# Patient Record
Sex: FEMALE | Race: BLACK OR AFRICAN AMERICAN | HISPANIC OR LATINO | Employment: FULL TIME | URBAN - METROPOLITAN AREA
[De-identification: names, ages, dates, MRNs, and addresses within clinical notes are randomized per-mention and may not be internally consistent; named-entity substitution may affect disease eponyms.]

---

## 2017-07-05 ENCOUNTER — ALLSCRIPTS OFFICE VISIT (OUTPATIENT)
Dept: OTHER | Facility: OTHER | Age: 27
End: 2017-07-05

## 2017-07-05 LAB — HCG, QUALITATIVE (HISTORICAL): NEGATIVE

## 2017-07-06 ENCOUNTER — LAB REQUISITION (OUTPATIENT)
Dept: LAB | Facility: HOSPITAL | Age: 27
End: 2017-07-06
Payer: COMMERCIAL

## 2017-07-06 DIAGNOSIS — Z72.51 HIGH RISK HETEROSEXUAL BEHAVIOR: ICD-10-CM

## 2017-07-06 PROCEDURE — 87591 N.GONORRHOEAE DNA AMP PROB: CPT | Performed by: OBSTETRICS & GYNECOLOGY

## 2017-07-06 PROCEDURE — 87491 CHLMYD TRACH DNA AMP PROBE: CPT | Performed by: OBSTETRICS & GYNECOLOGY

## 2017-07-07 LAB
CHLAMYDIA DNA CVX QL NAA+PROBE: NORMAL
N GONORRHOEA DNA GENITAL QL NAA+PROBE: NORMAL

## 2017-07-21 ENCOUNTER — HOSPITAL ENCOUNTER (EMERGENCY)
Facility: HOSPITAL | Age: 27
Discharge: HOME | End: 2017-07-21
Admitting: EMERGENCY MEDICINE
Payer: COMMERCIAL

## 2017-07-21 VITALS
SYSTOLIC BLOOD PRESSURE: 111 MMHG | WEIGHT: 130 LBS | DIASTOLIC BLOOD PRESSURE: 73 MMHG | RESPIRATION RATE: 16 BRPM | HEART RATE: 106 BPM | TEMPERATURE: 99.1 F | OXYGEN SATURATION: 97 %

## 2017-07-21 DIAGNOSIS — S41.112A LACERATION OF LEFT UPPER ARM, INITIAL ENCOUNTER: Primary | ICD-10-CM

## 2017-07-21 PROCEDURE — 99282 EMERGENCY DEPT VISIT SF MDM: CPT

## 2017-07-21 RX ORDER — LIDOCAINE HYDROCHLORIDE AND EPINEPHRINE 10; 10 MG/ML; UG/ML
10 INJECTION, SOLUTION INFILTRATION; PERINEURAL ONCE
Status: COMPLETED | OUTPATIENT
Start: 2017-07-21 | End: 2017-07-21

## 2017-07-21 RX ADMIN — LIDOCAINE HYDROCHLORIDE,EPINEPHRINE BITARTRATE 10 ML: 10; .01 INJECTION, SOLUTION INFILTRATION; PERINEURAL at 20:09

## 2017-08-02 ENCOUNTER — HOSPITAL ENCOUNTER (EMERGENCY)
Facility: HOSPITAL | Age: 27
Discharge: HOME | End: 2017-08-02
Admitting: EMERGENCY MEDICINE
Payer: COMMERCIAL

## 2017-08-02 VITALS
WEIGHT: 131.6 LBS | TEMPERATURE: 98.1 F | RESPIRATION RATE: 16 BRPM | HEART RATE: 93 BPM | SYSTOLIC BLOOD PRESSURE: 122 MMHG | DIASTOLIC BLOOD PRESSURE: 56 MMHG | OXYGEN SATURATION: 96 %

## 2017-08-02 DIAGNOSIS — Z48.02 ENCOUNTER FOR REMOVAL OF SUTURES: Primary | ICD-10-CM

## 2017-08-02 PROCEDURE — 99281 EMR DPT VST MAYX REQ PHY/QHP: CPT

## 2017-08-29 ENCOUNTER — GENERIC CONVERSION - ENCOUNTER (OUTPATIENT)
Dept: OTHER | Facility: OTHER | Age: 27
End: 2017-08-29

## 2017-08-29 ENCOUNTER — ALLSCRIPTS OFFICE VISIT (OUTPATIENT)
Dept: OTHER | Facility: OTHER | Age: 27
End: 2017-08-29

## 2017-08-29 DIAGNOSIS — Z34.91 ENCOUNTER FOR SUPERVISION OF NORMAL PREGNANCY IN FIRST TRIMESTER: ICD-10-CM

## 2017-09-05 ENCOUNTER — GENERIC CONVERSION - ENCOUNTER (OUTPATIENT)
Dept: OTHER | Facility: OTHER | Age: 27
End: 2017-09-05

## 2017-09-12 ENCOUNTER — ALLSCRIPTS OFFICE VISIT (OUTPATIENT)
Dept: OTHER | Facility: OTHER | Age: 27
End: 2017-09-12

## 2017-09-13 ENCOUNTER — GENERIC CONVERSION - ENCOUNTER (OUTPATIENT)
Dept: OTHER | Facility: OTHER | Age: 27
End: 2017-09-13

## 2017-09-13 ENCOUNTER — ALLSCRIPTS OFFICE VISIT (OUTPATIENT)
Dept: OTHER | Facility: OTHER | Age: 27
End: 2017-09-13

## 2017-09-13 ENCOUNTER — LAB REQUISITION (OUTPATIENT)
Dept: LAB | Facility: HOSPITAL | Age: 27
End: 2017-09-13
Payer: COMMERCIAL

## 2017-09-13 DIAGNOSIS — Z34.91 ENCOUNTER FOR SUPERVISION OF NORMAL PREGNANCY IN FIRST TRIMESTER: ICD-10-CM

## 2017-09-13 PROCEDURE — G0145 SCR C/V CYTO,THINLAYER,RESCR: HCPCS | Performed by: STUDENT IN AN ORGANIZED HEALTH CARE EDUCATION/TRAINING PROGRAM

## 2017-09-23 LAB
LAB AP GYN PRIMARY INTERPRETATION: NORMAL
Lab: NORMAL
PATH INTERP SPEC-IMP: NORMAL

## 2017-10-11 ENCOUNTER — GENERIC CONVERSION - ENCOUNTER (OUTPATIENT)
Dept: OTHER | Facility: OTHER | Age: 27
End: 2017-10-11

## 2017-10-11 ENCOUNTER — ALLSCRIPTS OFFICE VISIT (OUTPATIENT)
Dept: OTHER | Facility: OTHER | Age: 27
End: 2017-10-11

## 2017-10-11 LAB
CLUE CELL (HISTORICAL): NORMAL
GLUCOSE (HISTORICAL): NORMAL
HYPHAL YEAST (HISTORICAL): NORMAL
KETONES UR STRIP-MCNC: NORMAL MG/DL
KOH PREP (HISTORICAL): NORMAL
LEUKOCYTE ESTERASE UR QL STRIP: NORMAL
NITRITE UR QL STRIP: NORMAL
PH UR STRIP.AUTO: 4.5 [PH]
PROT UR STRIP-MCNC: 1 MG/DL
TRICHOMONAS (HISTORICAL): NORMAL
YEAST (HISTORICAL): NORMAL

## 2017-10-31 ENCOUNTER — GENERIC CONVERSION - ENCOUNTER (OUTPATIENT)
Dept: OTHER | Facility: OTHER | Age: 27
End: 2017-10-31

## 2017-10-31 DIAGNOSIS — Z34.92 ENCOUNTER FOR SUPERVISION OF NORMAL PREGNANCY IN SECOND TRIMESTER: ICD-10-CM

## 2017-11-22 ENCOUNTER — ALLSCRIPTS OFFICE VISIT (OUTPATIENT)
Dept: PERINATAL CARE | Facility: CLINIC | Age: 27
End: 2017-11-22
Payer: COMMERCIAL

## 2017-11-22 ENCOUNTER — GENERIC CONVERSION - ENCOUNTER (OUTPATIENT)
Dept: OTHER | Facility: OTHER | Age: 27
End: 2017-11-22

## 2017-11-22 PROCEDURE — 76817 TRANSVAGINAL US OBSTETRIC: CPT | Performed by: OBSTETRICS & GYNECOLOGY

## 2017-11-22 PROCEDURE — 76811 OB US DETAILED SNGL FETUS: CPT | Performed by: OBSTETRICS & GYNECOLOGY

## 2017-11-29 ENCOUNTER — APPOINTMENT (OUTPATIENT)
Dept: LAB | Facility: HOSPITAL | Age: 27
End: 2017-11-29
Payer: COMMERCIAL

## 2017-11-29 DIAGNOSIS — Z34.92 ENCOUNTER FOR SUPERVISION OF NORMAL PREGNANCY IN SECOND TRIMESTER: ICD-10-CM

## 2017-11-29 PROCEDURE — 84702 CHORIONIC GONADOTROPIN TEST: CPT

## 2017-11-29 PROCEDURE — 86336 INHIBIN A: CPT

## 2017-11-29 PROCEDURE — 36415 COLL VENOUS BLD VENIPUNCTURE: CPT

## 2017-11-29 PROCEDURE — 82677 ASSAY OF ESTRIOL: CPT

## 2017-11-29 PROCEDURE — 82105 ALPHA-FETOPROTEIN SERUM: CPT

## 2017-12-04 ENCOUNTER — GENERIC CONVERSION - ENCOUNTER (OUTPATIENT)
Dept: OTHER | Facility: OTHER | Age: 27
End: 2017-12-04

## 2017-12-04 ENCOUNTER — APPOINTMENT (OUTPATIENT)
Dept: PERINATAL CARE | Facility: CLINIC | Age: 27
End: 2017-12-04
Payer: COMMERCIAL

## 2017-12-04 PROCEDURE — 76817 TRANSVAGINAL US OBSTETRIC: CPT | Performed by: OBSTETRICS & GYNECOLOGY

## 2017-12-05 LAB
2ND TRIMESTER 4 SCREEN SERPL-IMP: NORMAL
2ND TRIMESTER 4 SCREEN SERPL-IMP: NORMAL
AFP ADJ MOM SERPL: 2.23
AFP SERPL-MCNC: 91.4 NG/ML
AGE AT DELIVERY: 26.1 YEARS
FET TS 18 RISK FROM MAT AGE: NORMAL
FET TS 21 RISK FROM MAT AGE: 977
GA METHOD: NORMAL
GA: 16.9 WEEKS
HCG ADJ MOM SERPL: 0.73
HCG SERPL-ACNC: NORMAL MIU/ML
IDDM PATIENT QL: NO
INHIBIN A ADJ MOM SERPL: 0.89
INHIBIN A SERPL-MCNC: 170.43 PG/ML
KARYOTYP BLD/T: NORMAL
MULTIPLE PREGNANCY: NO
NEURAL TUBE DEFECT RISK FETUS: 492 %
SERVICE CMNT-IMP: NORMAL
TS 18 RISK FETUS: NORMAL
TS 21 RISK FETUS: NORMAL
U ESTRIOL ADJ MOM SERPL: 1.59
U ESTRIOL SERPL-MCNC: 1.72 NG/ML

## 2017-12-18 ENCOUNTER — GENERIC CONVERSION - ENCOUNTER (OUTPATIENT)
Dept: OTHER | Facility: OTHER | Age: 27
End: 2017-12-18

## 2017-12-18 ENCOUNTER — APPOINTMENT (OUTPATIENT)
Dept: PERINATAL CARE | Facility: CLINIC | Age: 27
End: 2017-12-18
Payer: COMMERCIAL

## 2017-12-18 PROCEDURE — 76817 TRANSVAGINAL US OBSTETRIC: CPT | Performed by: OBSTETRICS & GYNECOLOGY

## 2017-12-18 PROCEDURE — 76815 OB US LIMITED FETUS(S): CPT | Performed by: OBSTETRICS & GYNECOLOGY

## 2018-01-10 ENCOUNTER — GENERIC CONVERSION - ENCOUNTER (OUTPATIENT)
Dept: OTHER | Facility: OTHER | Age: 28
End: 2018-01-10

## 2018-01-10 DIAGNOSIS — Z34.92 ENCOUNTER FOR SUPERVISION OF NORMAL PREGNANCY IN SECOND TRIMESTER: ICD-10-CM

## 2018-01-13 VITALS
SYSTOLIC BLOOD PRESSURE: 103 MMHG | WEIGHT: 126 LBS | DIASTOLIC BLOOD PRESSURE: 70 MMHG | HEART RATE: 81 BPM | BODY MASS INDEX: 20.97 KG/M2

## 2018-01-13 VITALS
DIASTOLIC BLOOD PRESSURE: 72 MMHG | HEIGHT: 65 IN | WEIGHT: 134 LBS | SYSTOLIC BLOOD PRESSURE: 116 MMHG | BODY MASS INDEX: 22.33 KG/M2

## 2018-01-15 ENCOUNTER — OB ABSTRACT (OUTPATIENT)
Dept: OBGYN CLINIC | Facility: CLINIC | Age: 28
End: 2018-01-15

## 2018-01-15 RX ORDER — PRENATAL WITH FERROUS FUM AND FOLIC ACID 3080; 920; 120; 400; 22; 1.84; 3; 20; 10; 1; 12; 200; 27; 25; 2 [IU]/1; [IU]/1; MG/1; [IU]/1; MG/1; MG/1; MG/1; MG/1; MG/1; MG/1; UG/1; MG/1; MG/1; MG/1; MG/1
1 TABLET ORAL DAILY
COMMUNITY
End: 2018-09-11

## 2018-01-15 NOTE — PROGRESS NOTES
Chief Complaint  Patient is here for Depo today  Active Problems    1  Birth control (V25 9) (Z30 9)   2  Depot contraception (V25 49) (Z30 40)   3  Encounter for insertion of mirena IUD (V25 11) (Z30 430)   4  Gonorrhea (098 0) (A54 9)   5  Infection, chlamydia (079 98) (A74 9)   6  Screen for STD (sexually transmitted disease) (V74 5) (Z11 3)    Current Meds   1  Azithromycin 250 MG Oral Tablet; take 1 gram PO; Therapy: 74LFQ8766 to (Last Rx:30Jun2016)  Requested for: 68XNX4630 Ordered    Allergies    1  No Known Drug Allergies    2  No Known Environmental Allergies   3  No Known Food Allergies    Vitals  Signs    Systolic: 127  Diastolic: 72  Weight: 917 lb     Assessment    1  Depot contraception (V25 49) (Z30 40)    Plan  SocHx: Depot contraception    · MedroxyPROGESTERone Acetate 150 MG/ML Intramuscular Suspension  (Depo-Provera); INJECT INTRAMUSCULARLY AS DIRECTED    Signatures   Electronically signed by :  Abelardo Reyes, ; Sep 21 2016  5:20PM EST                       (Co-author)

## 2018-01-16 NOTE — MISCELLANEOUS
Message  Patient is having bad nausea  Stacey Eusebio WALKER ordered zofran for patient ot take  if she is still nauseus she needs to call and schedule an apt  or go to the ED if she is dehyrated      Active Problems    1  History of Depot contraception (V25 49) (Z30 40)   2  Encounter for insertion of mirena IUD (V25 11) (Z30 430)   3  Encounter for pregnancy related examination in first trimester (V22 1) (Z34 91)   4  Encounter for screening for HIV (V73 89) (Z11 4)   5  High-risk sexual behavior (V69 2) (Z72 51)   6  Nausea and vomiting in pregnancy (643 90) (O21 9)   7  Screen for STD (sexually transmitted disease) (V74 5) (Z11 3)    Current Meds   1  Ondansetron 4 MG Oral Tablet Disintegrating (Zofran ODT); 1 po q 6 hr prn N/V;   Therapy: 25CAT6897 to (Evaluate:66Men0860)  Requested for: 33Pgl2746; Last   Rx:88Lhf7055 Ordered   2  PNV Prenatal Plus Multivitamin 27-1 MG Oral Tablet; TAKE 1 TABLET DAILY; Therapy: 36Iqf9862 to (Evaluate:84Hzh0021)  Requested for: 99Tjd8852; Last   Rx:76Qoq9935 Ordered    Allergies    1  No Known Drug Allergies    2  No Known Environmental Allergies   3   No Known Food Allergies    Signatures   Electronically signed by : Chase Mauro RN; Sep  5 2017  2:16PM EST                       (Author)

## 2018-01-16 NOTE — MISCELLANEOUS
Provider Comments  Provider Comments:   PT NO SHOW FOR HER PN APPT  LEFT MESSAGE TO R/S HER APPT        Signatures   Electronically signed by : Idris Szymanski, ; Sep 12 2017  9:24AM EST                       (Author)

## 2018-01-17 NOTE — PROGRESS NOTES
2017         RE: Katharine Lucero                                  To: [address]   MR#: [id number]                                   [address] [place] [address]   : [address]  ENC: [id number]                             [place], [address]   (Exam #: [id number])                           Fax: [phone]      The LMP of this 24year old,  G5, P1-1-2-3 patient was unknown, her   working SHOSHANA is 2018 and the current gestational age is 25 weeks 0   days by [address] [place] [address]  A sonographic examination was performed on 2017 using real time equipment  The ultrasound examination was   performed using abdominal & vaginal techniques  The patient has a BMI of   21 5  Her blood pressure today was 101/68  Earliest US on record: 17  10w0d  SHOSHANA  18       Problem list:   1  History of a prior  delivery with twins at 36 weeks and five   days  Her cervix was short with a jenni of 13 millimeters in that   pregnancy  She reports she did not take the vaginal progesterone that was   ordered in that pregnancy  2   A short cervix of 13 millimeters was noted at 20 weeks in this   pregnancy  3   She has not completed any genetic screening  4   History of GC and chlamydia diagnosed and treated in this pregnancy   and her test of cure was negative        Cardiac motion was observed at 146 bpm       INDICATIONS      fetal anatomical survey      Exam Types      LEVEL II   Transvaginal      RESULTS      Fetus # 1 of 1   Transverse presentation   Fetal growth appeared normal   Placenta Location = Posterior   No placenta previa   Placenta Grade = I      MEASUREMENTS (* Included In Average GA)      AC              15 1 cm        20 weeks 0 days* (51%)   BPD              4 2 cm        18 weeks 6 days* (23%)   HC              16 7 cm        19 weeks 2 days* (27%)   Femur            3 2 cm        20 weeks 1 day * (44%)      Nuchal Fold      3 5 mm      Humerus          3 1 cm        20 weeks 3 days  (61%)      Cerebellum       2 2 cm        21 weeks 1 day   Biorbit          3 1 cm        20 weeks 0 days   CisternaMagna    3 9 mm      HC/AC           1 11   FL/AC           0 21   FL/BPD          0 76   EFW (Ac/Fl/Hc)   327 grams - 0 lbs 12 oz      THE AVERAGE GESTATIONAL AGE is 19 weeks 4 days +/- 10 days  AMNIOTIC FLUID         Largest Vertical Pocket = 3 3 cm   Amniotic Fluid: Normal      CERVICAL EVALUATION      SUPINE      Cervical Length: 2 30 cm           Funnel Length: 0 75 cm         Funnel Width: 1 26 cm               Funnelin 59 %      POST TRANS FUNDAL PRESSURE      Cervical Length: 1 76 cm           Funnel Length: 0 83 cm         Funnel Width: 1 35 cm               Funnelin 05 %      OTHER TEST RESULTS           Funneling?: Yes      ANATOMY      Head                                    Normal   Face/Neck                               Normal   Th  Cav  Normal   Heart                                   Normal   Abd  Cav  Normal   Stomach                                 Normal   Right Kidney                            Normal   Left Kidney                             Normal   Bladder                                 Normal   Abd  Wall                               Normal   Spine                                   See Details   Extrems                                 Normal   Genitalia                               Normal   Placenta                                Normal   Umbl   Cord                              Normal   Uterus                                  Normal   PCI                                     Normal      ANATOMY DETAILS      Visualized Appearing Sonographically Normal:   HEAD: (Calvarium, BPD Level, Cavum, Lateral Ventricles, Choroid Plexus,   Cerebellum, Cisterna Magna);    FACE/NECK: (Neck, Nuchal Fold, Profile, Orbits, Nose/Lips, Palate, Face);    TH  CAV  : (Lungs, Diaphragm); HEART: (Four Chamber View, Proximal Left Outflow, Proximal Right Outflow,   3VV, 3 Vessel Trachea, Short Axis of Greater Vessels, Ductal Arch, Aortic   Arch, Interventricular Septum, Interatrial Septum, IVC, SVC, Cardiac Axis,   Cardiac Position);    ABD  CAV : (Liver, Gall Bladder);    STOMACH, RIGHT   KIDNEY, LEFT KIDNEY, BLADDER, ABD  WALL, EXTREMS: (Lt Humerus, Rt Humerus,   Lt Forearm, Rt Forearm, Lt Hand, Rt Hand, Lt Femur, Rt Femur, Lt Low Leg,   Rt Low Leg, Lt Foot, Rt Foot);    GENITALIA, PLACENTA, UMBL  CORD, UTERUS,   PCI      Suboptimally Visualized:   SPINE: (Cervical Spine, Thoracic Spine, Lumbar Spine, Sacrum)      ANATOMY COMMENTS      Her survey of the fetal anatomy is not complete  No fetal structural abnormality or ultrasound marker for aneuploidy is   identified on the Level II ultrasound study today  The missed or limited   views above are secondary to her skin thickness, fetal position, late   gestational age  Fetal growth and amniotic fluid volume appear normal     Active movement of the fetal body & extremities was seen  There is no   suspicion of a subchorionic bleed  The placental cord insertion was   normal       ADNEXA      The left ovary appeared normal and measured 2 8 x 3 0 x 2 6 cm with a   volume of 11 4 cc  The right ovary appeared normal and measured 2 8 x 3 0   x 1 5 cm with a volume of 6 6 cc  IMPRESSION      Vila IUP   19 weeks and 4 days by this ultrasound  (SHOSHANA=2018)   20 weeks and 0 days by 1st Tri Sono  (SHOSHANA=2018)   Transverse presentation   Fetal growth appeared normal   Regular fetal heart rate of 146 bpm   Posterior placenta   No placenta previa      Jodi Areas      Dear Dr Linda Fernandez,      Thank you for requesting a  consultation on your patient Ms Rinaldi for the following indications: hx of PTD with twins at 44w9d and   fetal anatomy      The patient was informed of the findings and counseled about the   limitations of the exam in detecting all forms of fetal congenital   abnormalities  Her cervix is short today and is similar to her prior   pregnancy with twins that ultimately delivered at 36 weeks and five days  She denies any vaginal bleeding or uterine cramping/contractions  She does   feel fetal movement  Follow up recommended:   1  Recommend a follow-up ultrasound at  32 weeks for growth due to her   history of a short cervix   2  Reviewed the signs and symptoms of  labor and when to call her   OB provider  3  Prescribed vaginal progesterone 200 milligrams daily to be taken q h s    until 36 weeks and six days  She is aware she has an increased risk for    delivery based on her cervix that is measuring less than 20   millimeters  4  Recommend a follow-up transvaginal scan in one week to see if there is   any further progression  The majority of time (greater then 50%) was spent on counseling and   coordination of care of this patient and /or family member  Approximate   face to face time was 15 minutes  GUY Aburto M D     Maternal-Fetal Medicine   Electronically signed 17 17:26

## 2018-01-22 VITALS
DIASTOLIC BLOOD PRESSURE: 73 MMHG | HEIGHT: 65 IN | SYSTOLIC BLOOD PRESSURE: 110 MMHG | WEIGHT: 126.5 LBS | BODY MASS INDEX: 21.08 KG/M2

## 2018-01-22 VITALS
HEIGHT: 65 IN | BODY MASS INDEX: 21.09 KG/M2 | DIASTOLIC BLOOD PRESSURE: 66 MMHG | WEIGHT: 126.6 LBS | HEART RATE: 77 BPM | SYSTOLIC BLOOD PRESSURE: 99 MMHG

## 2018-01-22 VITALS
WEIGHT: 129 LBS | HEIGHT: 65 IN | DIASTOLIC BLOOD PRESSURE: 68 MMHG | BODY MASS INDEX: 21.49 KG/M2 | SYSTOLIC BLOOD PRESSURE: 101 MMHG

## 2018-01-22 VITALS — SYSTOLIC BLOOD PRESSURE: 99 MMHG | WEIGHT: 129 LBS | BODY MASS INDEX: 21.47 KG/M2 | DIASTOLIC BLOOD PRESSURE: 67 MMHG

## 2018-01-23 NOTE — PROGRESS NOTES
DEC 18 2017         RE: Katharine Lucero                                  To: [address]   MR#: [id number]                                   [address] [place] [address]   : [address]  ENC: [id number]                             hospitals, [address] [place] [address]   (Exam #: [id number])                           Fax: [phone]      The LMP of this 24year old,  G5, P1-1-2-3 patient was unknown, her   working SHOSHANA is 2018 and the current gestational age is 20 weeks 5   days by [address] [place] [address]  A sonographic examination was performed on DEC   18 2017 using real time equipment  The ultrasound examination was   performed using abdominal & vaginal techniques  The patient has a BMI of   22 8  Her blood pressure today was 123/75  Earliest US on record: 17  10w0d  SHOSHANA  18       Problem list:   1  History of a prior  delivery with twins at 36 weeks and five   days  Her cervix was short with a jenni of 13 millimeters in that   pregnancy  She reports she did not take the vaginal progesterone that was   ordered in that pregnancy  2   A short cervix of 17 millimeters was noted at 20 weeks in this   pregnancy  3   She has not completed any genetic screening  4   History of GC and chlamydia diagnosed and treated in this pregnancy   and her test of cure was negative  Cardiac motion was observed at 149 bpm       INDICATIONS      cervical shortening      Exam Types      Transvaginal      RESULTS      Fetus # 1 of 1   Breech presentation   Placenta Location = Posterior   No placenta previa   Placenta Grade = I      AMNIOTIC FLUID         Largest Vertical Pocket = 4 7 cm   Amniotic Fluid: Normal      CERVICAL EVALUATION      The cervix appeared normal (Ultrasound Examination)        SUPINE      Cervical Length: 3 00 cm      OTHER TEST RESULTS           Funneling?: No             Dynamic Changes?: No Resp  To TFP?: No      IMPRESSION      Vila IUP   23 weeks and 5 days by 1st Tri Sono  (SHOSHANA=2018)   Breech presentation   Regular fetal heart rate of 149 bpm   Posterior placenta   No placenta previa      GENERAL COMMENT      Nicanor Child presents today for an ultrasound to evaluate her cervix  At her   level 2 ultrasound, she was found to have a short cervix however upon   follow-up it was normal   She was initially placed on vaginal progesterone   however the patient admits that she has not picked up this medication  I   encouraged her to do so despite her cervix being reassuring today  We   discussed  labor precautions and I encouraged her to return at her   previously scheduled 32 week growth ultrasound  Please note, in addition to the time spent discussing the results of the   ultrasound, I spent approximately 10 minutes of face-to-face time with the   patient, greater than 50% of which was spent in counseling and the   coordination of care for this patient  GUY Dunne , R LITA Vick     Electronically signed 17 13:29

## 2018-01-23 NOTE — PROGRESS NOTES
DEC 4 2017         RE: July Reyes                                  To: [address]   MR#: [id number]                                   [address] [place] [address]   : [facility]  ENC: [id number]:WALTER Rasheed, [address] [facility]    (Exam #: [id number])                           Fax: [phone]      The LMP of this 24year old,  G5, P1-1-2-3 patient was unknown, her   working SHOSHANA is 2018 and the current gestational age is 22 weeks 5   days by [address] [place] [address]  A sonographic examination was performed on DEC   4 2017 using real time equipment  The ultrasound examination was performed   using abdominal & vaginal techniques  The patient has a BMI of 22 5  Her   blood pressure today was 114/73  Earliest US on record: 17  10w0d  SHOSHANA  18       Problem list:   1  History of a prior  delivery with twins at 36 weeks and five   days  Her cervix was short with a jenni of 13 millimeters in that   pregnancy  She reports she did not take the vaginal progesterone that was   ordered in that pregnancy  2   A short cervix of 17 millimeters was noted at 20 weeks in this   pregnancy  3   She has not completed any genetic screening  4   History of GC and chlamydia diagnosed and treated in this pregnancy   and her test of cure was negative  She did not start her vaginal progesterone  Cardiac motion was observed at 158 bpm       INDICATIONS      cervical shortening   Evaluate missed anatomy      Exam Types      Transvaginal   Level I      RESULTS      Fetus # 1 of 1   Vertex presentation   Placenta Location = Posterior   No placenta previa      AMNIOTIC FLUID         Largest Vertical Pocket = 2 7 cm   Amniotic Fluid: Normal      CERVICAL EVALUATION      The cervix appeared normal (Ultrasound Examination)        SUPINE      Cervical Length: 3 00 cm      OTHER TEST RESULTS Funneling?: No             Dynamic Changes?: No        Resp  To TFP?: No      ANATOMY COMMENTS      Fetal anatomy has been previously documented; no anomalies were   identified  The prior US was limited in the area of the spine which is now    normal  This completes the fetal anatomy  The cervix appears normal today  IMPRESSION      Vila IUP   21 weeks and 5 days by 1st Tri Sono  (SHOSHANA=2018)   Vertex presentation   Regular fetal heart rate of 158 bpm   Posterior placenta   No placenta previa      GENERAL COMMENT      Recommend one last TVS in 2 weeks due to her hx of a short cervix found at   20 weeks and her hx of having GC and chlamydia diagnosed and treated in   this pregnancy  Both of these issues put her at increased risk for a    delivery  GUY Donahue M D     Maternal-Fetal Medicine   Electronically signed 17 12:35

## 2018-01-24 VITALS
HEIGHT: 65 IN | SYSTOLIC BLOOD PRESSURE: 114 MMHG | DIASTOLIC BLOOD PRESSURE: 73 MMHG | BODY MASS INDEX: 22.49 KG/M2 | WEIGHT: 135 LBS

## 2018-01-24 VITALS
WEIGHT: 137 LBS | HEIGHT: 65 IN | BODY MASS INDEX: 22.82 KG/M2 | SYSTOLIC BLOOD PRESSURE: 123 MMHG | DIASTOLIC BLOOD PRESSURE: 75 MMHG

## 2018-01-24 VITALS
HEART RATE: 96 BPM | WEIGHT: 137 LBS | DIASTOLIC BLOOD PRESSURE: 66 MMHG | BODY MASS INDEX: 22.8 KG/M2 | SYSTOLIC BLOOD PRESSURE: 100 MMHG

## 2018-01-31 ENCOUNTER — ROUTINE PRENATAL (OUTPATIENT)
Dept: OBGYN CLINIC | Facility: CLINIC | Age: 28
End: 2018-01-31
Payer: COMMERCIAL

## 2018-01-31 VITALS
WEIGHT: 143 LBS | BODY MASS INDEX: 23.82 KG/M2 | HEART RATE: 102 BPM | HEIGHT: 65 IN | DIASTOLIC BLOOD PRESSURE: 61 MMHG | SYSTOLIC BLOOD PRESSURE: 98 MMHG

## 2018-01-31 DIAGNOSIS — Z34.93 PRENATAL CARE IN THIRD TRIMESTER: Primary | ICD-10-CM

## 2018-01-31 DIAGNOSIS — Z87.51 HISTORY OF PRETERM DELIVERY: ICD-10-CM

## 2018-01-31 DIAGNOSIS — O26.879 SHORT CERVIX AFFECTING PREGNANCY: ICD-10-CM

## 2018-01-31 DIAGNOSIS — Z3A.30 30 WEEKS GESTATION OF PREGNANCY: ICD-10-CM

## 2018-01-31 DIAGNOSIS — B37.3 VAGINAL CANDIDIASIS: ICD-10-CM

## 2018-01-31 LAB
SL AMB  POCT GLUCOSE, UA: NORMAL
SL AMB POCT ALBUMIN: NORMAL
SL AMB POCT WET MOUNT: ABNORMAL

## 2018-01-31 PROCEDURE — 99213 OFFICE O/P EST LOW 20 MIN: CPT | Performed by: OBSTETRICS & GYNECOLOGY

## 2018-01-31 PROCEDURE — 87210 SMEAR WET MOUNT SALINE/INK: CPT | Performed by: OBSTETRICS & GYNECOLOGY

## 2018-01-31 PROCEDURE — 81002 URINALYSIS NONAUTO W/O SCOPE: CPT | Performed by: OBSTETRICS & GYNECOLOGY

## 2018-01-31 RX ORDER — CLOTRIMAZOLE AND BETAMETHASONE DIPROPIONATE 10; .64 MG/G; MG/G
CREAM TOPICAL 2 TIMES DAILY
Qty: 30 G | Refills: 0 | Status: SHIPPED | OUTPATIENT
Start: 2018-01-31 | End: 2018-03-17 | Stop reason: HOSPADM

## 2018-01-31 NOTE — PROGRESS NOTES
Assessment  24 y o  [id number] at 30w0d presenting for routine prenatal visit  Vaginal candidiasis noted with significant external symptoms  Plan  Diagnoses and all orders for this visit:    Prenatal care in third trimester  -     POCT urine dip  -     Prenatal Panel; Future  -     Glucose, 1H PG; Future  -     Reviewed  labor precautions  -     Return to office in 2 weeks    Vaginal candidiasis: significant external irritation noted  -     POCT wet mount  -     clotrimazole-betamethasone (LOTRISONE) 1-0 05 % cream; Apply topically 2 (two) times a day for 7 days        ____________________________________________________________        Subjective    Sharon Martinez is a 24 y o  [id number] at 30w0d who presents for routine prenatal visit  She is notes external vaginal and labial irritation x1wk  She also notes an increase in her vaginal discharge that is thin and without odor or color change  She notes it burns on the outside when she urinates, but not as it comes out  Denies urinary urgency or feeling of incomplete void  She contractions, loss of fluid, or vaginal bleeding  She feels regular fetal movements  Patient has not yet completed her prenatal panel or her 28wk labs  Reviewed with patient importance of labs in order to provide her the most complete care  Patient's pregnancy notable for short cervix noted at [address] (13mm)  She was prescribed vaginal progesterone, but has not been taking it       Pregnancy Problems:  Patient Active Problem List   Diagnosis    30 weeks gestation of pregnancy    History of  delivery    Short cervix affecting pregnancy         Objective     BP 98/61   Pulse 102   Ht 5' 5" (1 651 m)   Wt 64 9 kg (143 lb)   LMP 07/10/2017 (Approximate)   BMI 23 80 kg/m²   FHT: 144 BPM   Uterine Size: 28 cm   Pelvic Exam:     SSE External erythema at the labia and introitus without lesions or trauma, scant thick, white vaginal discharge without odor, no CMT    KOH +yeast, neg whiff    Wet mt Neg clue cells, neg trich    Urine dip Neg leuks, nitrites, blood, glucose, protein

## 2018-03-07 NOTE — PROGRESS NOTES
Education  Baby & Me Education 1st Trimester:   First Trimester Education provided:   benefits of breastfeeding, importance of exclusive breastfeeding, early initiation of breastfeeding, exclusive breastfeeding for the first 6 months and Pregnancy Essentials Reference Guide given   The patient is not planning on breastfeeding        Signatures   Electronically signed by : Imani Jones RN; Aug 29 2017  3:01PM EST                       (Author)

## 2018-03-17 ENCOUNTER — HOSPITAL ENCOUNTER (OUTPATIENT)
Facility: HOSPITAL | Age: 28
Discharge: HOME | End: 2018-03-17
Attending: OBSTETRICS & GYNECOLOGY | Admitting: OBSTETRICS & GYNECOLOGY
Payer: COMMERCIAL

## 2018-03-17 VITALS
RESPIRATION RATE: 18 BRPM | SYSTOLIC BLOOD PRESSURE: 116 MMHG | HEIGHT: 65 IN | BODY MASS INDEX: 24.99 KG/M2 | OXYGEN SATURATION: 96 % | DIASTOLIC BLOOD PRESSURE: 66 MMHG | HEART RATE: 104 BPM | TEMPERATURE: 97.7 F | WEIGHT: 150 LBS

## 2018-03-17 LAB
ABO GROUP BLD: NORMAL
ANISOCYTOSIS BLD QL SMEAR: PRESENT
BASOPHILS # BLD MANUAL: 0 THOUSAND/UL (ref 0–0.1)
BASOPHILS NFR MAR MANUAL: 0 % (ref 0–1)
BLD GP AB SCN SERPL QL: NEGATIVE
EOSINOPHIL # BLD MANUAL: 0.12 THOUSAND/UL (ref 0–0.4)
EOSINOPHIL NFR BLD MANUAL: 2 % (ref 0–6)
ERYTHROCYTE [DISTWIDTH] IN BLOOD BY AUTOMATED COUNT: 14.1 % (ref 11.6–15.1)
HCT VFR BLD AUTO: 31.5 % (ref 34.8–46.1)
HGB BLD-MCNC: 10.2 G/DL (ref 11.5–15.4)
LG PLATELETS BLD QL SMEAR: PRESENT
LYMPHOCYTES # BLD AUTO: 1.18 THOUSAND/UL (ref 0.6–4.47)
LYMPHOCYTES # BLD AUTO: 19 % (ref 14–44)
MCH RBC QN AUTO: 27.1 PG (ref 26.8–34.3)
MCHC RBC AUTO-ENTMCNC: 32.4 G/DL (ref 31.4–37.4)
MCV RBC AUTO: 84 FL (ref 82–98)
MONOCYTES # BLD AUTO: 0.25 THOUSAND/UL (ref 0–1.22)
MONOCYTES NFR BLD: 4 % (ref 4–12)
NEUTROPHILS # BLD MANUAL: 4.65 THOUSAND/UL (ref 1.85–7.62)
NEUTS BAND NFR BLD MANUAL: 6 % (ref 0–8)
NEUTS SEG NFR BLD AUTO: 69 % (ref 43–75)
NRBC BLD AUTO-RTO: 0 /100 WBCS
PLATELET # BLD AUTO: 106 THOUSANDS/UL (ref 149–390)
PLATELET BLD QL SMEAR: ABNORMAL
PMV BLD AUTO: 12.3 FL (ref 8.9–12.7)
RBC # BLD AUTO: 3.76 MILLION/UL (ref 3.81–5.12)
RH BLD: POSITIVE
SPECIMEN EXPIRATION DATE: NORMAL
TOTAL CELLS COUNTED SPEC: 100
WBC # BLD AUTO: 6.2 THOUSAND/UL (ref 4.31–10.16)

## 2018-03-17 PROCEDURE — 87653 STREP B DNA AMP PROBE: CPT | Performed by: OBSTETRICS & GYNECOLOGY

## 2018-03-17 PROCEDURE — 85007 BL SMEAR W/DIFF WBC COUNT: CPT | Performed by: OBSTETRICS & GYNECOLOGY

## 2018-03-17 PROCEDURE — 86592 SYPHILIS TEST NON-TREP QUAL: CPT | Performed by: OBSTETRICS & GYNECOLOGY

## 2018-03-17 PROCEDURE — 87491 CHLMYD TRACH DNA AMP PROBE: CPT | Performed by: OBSTETRICS & GYNECOLOGY

## 2018-03-17 PROCEDURE — 99203 OFFICE O/P NEW LOW 30 MIN: CPT

## 2018-03-17 PROCEDURE — 86850 RBC ANTIBODY SCREEN: CPT | Performed by: OBSTETRICS & GYNECOLOGY

## 2018-03-17 PROCEDURE — 87591 N.GONORRHOEAE DNA AMP PROB: CPT | Performed by: OBSTETRICS & GYNECOLOGY

## 2018-03-17 PROCEDURE — 86900 BLOOD TYPING SEROLOGIC ABO: CPT | Performed by: OBSTETRICS & GYNECOLOGY

## 2018-03-17 PROCEDURE — 86901 BLOOD TYPING SEROLOGIC RH(D): CPT | Performed by: OBSTETRICS & GYNECOLOGY

## 2018-03-17 PROCEDURE — 85027 COMPLETE CBC AUTOMATED: CPT | Performed by: OBSTETRICS & GYNECOLOGY

## 2018-03-17 RX ORDER — FLUCONAZOLE 150 MG/1
150 TABLET ORAL ONCE
Status: COMPLETED | OUTPATIENT
Start: 2018-03-17 | End: 2018-03-17

## 2018-03-17 RX ADMIN — FLUCONAZOLE 150 MG: 150 TABLET ORAL at 12:15

## 2018-03-17 NOTE — PROGRESS NOTES
L&D Triage Note - OB/GYN  Calderon Alanis 24 y o  female MRN: [id number]  Unit/Bed#: L&D 329-01 Encounter: [id number]      Assessment:  24 y o  [id number] at 36w3d with vulvovaginal candidiasis, not in  labor    Plan:  1   labor precautions reviewed  2  Fetal kick counts reviewed  3  Prenatal care: 28 week labs collected-CBC, T&S, RPR  4  GBS collected  5  GC/CT urine sent, f/u results  6  Strongly encouraged follow-up at routine prenatal care appointment this week  Counseled patient to call Monday morning to set up appointment for later that week  Discussed with Dr Vance Ramirez, DO        ______________________________________________________________________      Chief Compliant: Vaginal Discharge    TIME: 1005  Subjective:  24 y o  [id number] at 36w3d presents for evaluation of vaginal discharge  Reports clear white vaginal discharge for the past 2 weeks  Diagnosed with yeast infection earlier this pregnancy, prescribed lotrisone cream, noting minimal relief  Also reporting intermittent cramping  Denies vaginal bleeding, reports good fetal movement  Pregnancy complicated by poor prenatal care, last prenatal visit on   Patient states she has been too busy with work to make it to appointment  28 week labs not collected        Objective:  Vitals:    18 0949   BP: 138/59   Pulse: 104   Temp: 98 2 °F (36 8 °C)   SpO2: 96%       SVE: 1 / 50% / -2  FHT:  150 / Moderate 6 - 25 bpm / + accelerations, no decelerations  [place]: quiet    ROM: negative pooling, negative Nitrazine, negative ferning  Wet prep: + yeast, negative clue cells, negative trich  KOH: + yeast, negative whiff     UA notable for moderate leuks, moderate ketones    Fetal position: Vertex by ultrasound        Priti Adan DO 3/17/2018 10:05 AM

## 2018-03-17 NOTE — LETTER
[address] [place] [address] LABOR AND DELIVERY  [facility]  Dept: [phone]    March 17, 2018     Patient: Love Bermudez   YOB: 1990   Date of Visit: 3/17/2018       To Whom it May Concern:    Sarkis Belle is under my professional care  She was seen in the hospital from 3/17/2018   to 03/17/18  She may return to work on 3/19/18             Sincerely,          Piyush Lund DO  Department of Obstetrics and Gynecology  [address] [facility]

## 2018-03-17 NOTE — DISCHARGE INSTRUCTIONS
Pregnancy at [address] to [address]:   You are considered full term at the beginning of 37 weeks  Your breathing may be easier if your baby has moved down into a head-down position  You may need to urinate more often because the baby may be pressing on your bladder  You may also feel more discomfort and get tired easily  DISCHARGE INSTRUCTIONS:   Seek care immediately if:   · You develop a severe headache that does not go away  · You have new or increased vision changes, such as blurred or spotted vision  · You have new or increased swelling in your face or hands  · You have vaginal spotting or bleeding  · Your water broke or you feel warm water gushing or trickling from your vagina  Contact your healthcare provider if:   · You have more than 5 contractions in 1 hour  · You notice any changes in your baby's movements  · You have abdominal cramps, pressure, or tightening  · You have a change in vaginal discharge  · You have chills or a fever  · You have vaginal itching, burning, or pain  · You have yellow, green, white, or foul-smelling vaginal discharge  · You have pain or burning when you urinate, less urine than usual, or pink or bloody urine  · You have questions or concerns about your condition or care  How to care for yourself at this stage of your pregnancy:   · Eat a variety of healthy foods  Healthy foods include fruits, vegetables, whole-grain breads, low-fat dairy foods, beans, lean meats, and fish  Drink liquids as directed  Ask how much liquid to drink each day and which liquids are best for you  Limit caffeine to less than 200 milligrams each day  Limit your intake of fish to 2 servings each week  Choose fish low in mercury such as canned light tuna, shrimp, salmon, cod, or tilapia  Do not  eat fish high in mercury such as swordfish, tilefish, ayesha mackerel, and shark  · Take prenatal vitamins as directed    Your need for certain vitamins and minerals, such as folic acid, increases during pregnancy  Prenatal vitamins provide some of the extra vitamins and minerals you need  Prenatal vitamins may also help to decrease the risk of certain birth defects  · Rest as needed  Put your feet up if you have swelling in your ankles and feet  · Do not smoke  If you smoke, it is never too late to quit  Smoking increases your risk of a miscarriage and other health problems during your pregnancy  Smoking can cause your baby to be born too early or weigh less at birth  Ask your healthcare provider for information if you need help quitting  · Do not drink alcohol  Alcohol passes from your body to your baby through the placenta  It can affect your baby's brain development and cause fetal alcohol syndrome (FAS)  FAS is a group of conditions that causes mental, behavior, and growth problems  · Talk to your healthcare provider before you take any medicines  Many medicines may harm your baby if you take them when you are pregnant  Do not take any medicines, vitamins, herbs, or supplements without first talking to your healthcare provider  Never use illegal or street drugs (such as marijuana or cocaine) while you are pregnant  · Talk to your healthcare provider before you travel  You may not be able to travel in an airplane after 36 weeks  He may also recommend that you avoid long road trips  Safety tips:   · Avoid hot tubs and saunas  Do not use a hot tub or sauna while you are pregnant, especially during your first trimester  Hot tubs and saunas may raise your baby's temperature and increase the risk of birth defects  · Avoid toxoplasmosis  This is an infection caused by eating raw meat or being around infected cat feces  It can cause birth defects, miscarriages, and other problems  Wash your hands after you touch raw meat  Make sure any meat is well-cooked before you eat it  Avoid raw eggs and unpasteurized milk   Use gloves or ask someone else to clean your cat's litter box while you are pregnant  · Ask your healthcare provider about travel  The most comfortable time to travel is during the second trimester  Ask your healthcare provider if you can travel after 36 weeks  You may not be able to travel in an airplane after 36 weeks  He may also recommend that you avoid long road trips  Changes that are happening with your baby:  By 38 weeks, your baby may weigh between 6 and 9 pounds  Your baby may be about 14 inches long from the top of the head to the rump (baby's bottom)  Your baby hears well enough to know your voice  As your baby gets larger, you may feel fewer kicks and more stretching and rolling  Your baby may move into a head-down position  Your baby will also rest lower in your abdomen  What you need to know about prenatal care: Your healthcare provider will check your blood pressure and weight  You may also need the following:  · A urine test  may also be done to check for sugar and protein  These can be signs of gestational diabetes or infection  Protein in your urine may also be a sign of preeclampsia  Preeclampsia is a condition that can develop during week 20 or later of your pregnancy  It causes high blood pressure, and it can cause problems with your kidneys and other organs  · A blood test  may be done to check for anemia (low iron level)  · A Tdap vaccine  may be recommended by your healthcare provider  · A group B strep test  is a test that is done to check for group B strep infection  Group B strep is a type of bacteria that may be found in the vagina or rectum  It can be passed to your baby during delivery if you have it  Your healthcare provider will take swab your vagina or rectum and send the sample to the lab for tests  · Fundal height  is a measurement of your uterus to check your baby's growth  This number is usually the same as the number of weeks that you have been pregnant   Your healthcare provider may also check your baby's position  · Your baby's heart rate  will be checked  © 2017 [address] [employer] [employer] Information is for End User's use only and may not be sold, redistributed or otherwise used for commercial purposes  All illustrations and images included in CareNotes® are the copyrighted property of A D A M [employer]  or Tamir Vasquez  The above information is an  only  It is not intended as medical advice for individual conditions or treatments  Talk to your doctor, nurse or pharmacist before following any medical regimen to see if it is safe and effective for you

## 2018-03-19 LAB
CHLAMYDIA DNA CVX QL NAA+PROBE: NORMAL
GP B STREP DNA SPEC QL NAA+PROBE: ABNORMAL
N GONORRHOEA DNA GENITAL QL NAA+PROBE: NORMAL
RPR SER QL: NORMAL

## 2018-03-21 DIAGNOSIS — O99.013 ANEMIA DURING PREGNANCY IN THIRD TRIMESTER: Primary | ICD-10-CM

## 2018-03-21 PROBLEM — B95.1 GROUP BETA STREP POSITIVE: Status: ACTIVE | Noted: 2018-03-21

## 2018-03-21 RX ORDER — DOCUSATE SODIUM 100 MG/1
100 CAPSULE, LIQUID FILLED ORAL 2 TIMES DAILY
Qty: 10 CAPSULE | Refills: 0 | Status: ON HOLD | OUTPATIENT
Start: 2018-03-21 | End: 2018-04-08 | Stop reason: CLARIF

## 2018-03-21 RX ORDER — FERROUS SULFATE TAB EC 324 MG (65 MG FE EQUIVALENT) 324 (65 FE) MG
324 TABLET DELAYED RESPONSE ORAL
Qty: 60 TABLET | Refills: 3 | Status: SHIPPED | OUTPATIENT
Start: 2018-03-21 | End: 2018-09-11

## 2018-03-23 ENCOUNTER — TELEPHONE (OUTPATIENT)
Dept: OBGYN CLINIC | Facility: CLINIC | Age: 28
End: 2018-03-23

## 2018-03-23 NOTE — TELEPHONE ENCOUNTER
Left messages on 3/22 and 3/23 for patient to call to schedule a PN apt, she hasn't been here since January  Have not received a phone call back yet

## 2018-04-02 ENCOUNTER — TELEPHONE (OUTPATIENT)
Dept: OBGYN CLINIC | Facility: CLINIC | Age: 28
End: 2018-04-02

## 2018-04-02 NOTE — TELEPHONE ENCOUNTER
Spoke to patient on the phone regarding anemia, she is to take iron BID, colace ordered as needed  Apt scheduled for 4/5

## 2018-04-08 ENCOUNTER — HOSPITAL ENCOUNTER (EMERGENCY)
Facility: HOSPITAL | Age: 28
End: 2018-04-08
Attending: EMERGENCY MEDICINE
Payer: COMMERCIAL

## 2018-04-08 ENCOUNTER — HOSPITAL ENCOUNTER (INPATIENT)
Facility: HOSPITAL | Age: 28
LOS: 2 days | Discharge: HOME | DRG: 560 | End: 2018-04-10
Attending: OBSTETRICS & GYNECOLOGY | Admitting: OBSTETRICS & GYNECOLOGY
Payer: COMMERCIAL

## 2018-04-08 VITALS
SYSTOLIC BLOOD PRESSURE: 107 MMHG | HEIGHT: 65 IN | HEART RATE: 106 BPM | DIASTOLIC BLOOD PRESSURE: 77 MMHG | WEIGHT: 145 LBS | BODY MASS INDEX: 24.16 KG/M2 | OXYGEN SATURATION: 100 % | RESPIRATION RATE: 18 BRPM

## 2018-04-08 DIAGNOSIS — Z3A.39 39 WEEKS GESTATION OF PREGNANCY: ICD-10-CM

## 2018-04-08 DIAGNOSIS — IMO0002 RUPTURE OF MEMBRANES WITH CLEAR AMNIOTIC FLUID: ICD-10-CM

## 2018-04-08 LAB
ABO GROUP BLD: NORMAL
ABO GROUP BLD: NORMAL
ALBUMIN SERPL BCP-MCNC: 2.6 G/DL (ref 3.5–5)
ALP SERPL-CCNC: 171 U/L (ref 46–116)
ALT SERPL W P-5'-P-CCNC: 14 U/L (ref 12–78)
AMPHETAMINES SERPL QL SCN: NEGATIVE
ANION GAP SERPL CALCULATED.3IONS-SCNC: 8 MMOL/L (ref 4–13)
ANISOCYTOSIS BLD QL SMEAR: PRESENT
APTT PPP: 30 SECONDS (ref 23–35)
AST SERPL W P-5'-P-CCNC: 16 U/L (ref 5–45)
BARBITURATES UR QL: NEGATIVE
BASE EXCESS BLDCOA CALC-SCNC: -3.9 MMOL/L (ref 3–11)
BASE EXCESS BLDCOV CALC-SCNC: -4.8 MMOL/L (ref 1–9)
BASOPHILS # BLD MANUAL: 0 THOUSAND/UL (ref 0–0.1)
BASOPHILS NFR MAR MANUAL: 0 % (ref 0–1)
BENZODIAZ UR QL: NEGATIVE
BILIRUB SERPL-MCNC: 0.3 MG/DL (ref 0.2–1)
BLD GP AB SCN SERPL QL: NEGATIVE
BLD GP AB SCN SERPL QL: NEGATIVE
BUN SERPL-MCNC: 11 MG/DL (ref 5–25)
CALCIUM SERPL-MCNC: 8.4 MG/DL (ref 8.3–10.1)
CHLORIDE SERPL-SCNC: 105 MMOL/L (ref 100–108)
CO2 SERPL-SCNC: 25 MMOL/L (ref 21–32)
COCAINE UR QL: NEGATIVE
CREAT SERPL-MCNC: 0.64 MG/DL (ref 0.6–1.3)
EOSINOPHIL # BLD MANUAL: 0 THOUSAND/UL (ref 0–0.4)
EOSINOPHIL NFR BLD MANUAL: 0 % (ref 0–6)
ERYTHROCYTE [DISTWIDTH] IN BLOOD BY AUTOMATED COUNT: 14.5 % (ref 11.6–15.1)
GFR SERPL CREATININE-BSD FRML MDRD: 142 ML/MIN/1.73SQ M
GLUCOSE SERPL-MCNC: 81 MG/DL (ref 65–140)
HCO3 BLDCOA-SCNC: 23.9 MMOL/L (ref 17.3–27.3)
HCO3 BLDCOV-SCNC: 20.8 MMOL/L (ref 12.2–28.6)
HCT VFR BLD AUTO: 31 % (ref 34.8–46.1)
HGB BLD-MCNC: 10.1 G/DL (ref 11.5–15.4)
INR PPP: 0.97 (ref 0.86–1.16)
LG PLATELETS BLD QL SMEAR: PRESENT
LYMPHOCYTES # BLD AUTO: 1.35 THOUSAND/UL (ref 0.6–4.47)
LYMPHOCYTES # BLD AUTO: 23 % (ref 14–44)
MAGNESIUM SERPL-MCNC: 1.8 MG/DL (ref 1.6–2.6)
MCH RBC QN AUTO: 26.8 PG (ref 26.8–34.3)
MCHC RBC AUTO-ENTMCNC: 32.6 G/DL (ref 31.4–37.4)
MCV RBC AUTO: 82 FL (ref 82–98)
METHADONE UR QL: NEGATIVE
MONOCYTES # BLD AUTO: 0.35 THOUSAND/UL (ref 0–1.22)
MONOCYTES NFR BLD: 6 % (ref 4–12)
NEUTROPHILS # BLD MANUAL: 4.18 THOUSAND/UL (ref 1.85–7.62)
NEUTS BAND NFR BLD MANUAL: 1 % (ref 0–8)
NEUTS SEG NFR BLD AUTO: 70 % (ref 43–75)
O2 CT VFR BLDCOA CALC: 8.3 ML/DL
OPIATES UR QL SCN: NEGATIVE
OXYHGB MFR BLDCOA: 41.2 %
OXYHGB MFR BLDCOV: 70 %
PCO2 BLDCOA: 54.9 MM[HG] (ref 30–60)
PCO2 BLDCOV: 40.3 MM HG (ref 27–43)
PCP UR QL: NEGATIVE
PH BLDCOA: 7.26 [PH] (ref 7.23–7.43)
PH BLDCOV: 7.33 [PH] (ref 7.19–7.49)
PLATELET # BLD AUTO: 102 THOUSANDS/UL (ref 149–390)
PLATELET BLD QL SMEAR: ABNORMAL
PMV BLD AUTO: 11.1 FL (ref 8.9–12.7)
PO2 BLDCOA: 19.5 MM HG (ref 5–25)
PO2 BLDCOV: 28.4 MM HG (ref 15–45)
POTASSIUM SERPL-SCNC: 3.3 MMOL/L (ref 3.5–5.3)
PROT SERPL-MCNC: 6.4 G/DL (ref 6.4–8.2)
PROTHROMBIN TIME: 12.8 SECONDS (ref 12.1–14.4)
RBC # BLD AUTO: 3.77 MILLION/UL (ref 3.81–5.12)
RH BLD: POSITIVE
RH BLD: POSITIVE
SAO2 % BLDCOV: 13.2 ML/DL
SODIUM SERPL-SCNC: 138 MMOL/L (ref 136–145)
SPECIMEN EXPIRATION DATE: NORMAL
SPECIMEN EXPIRATION DATE: NORMAL
THC UR QL: NEGATIVE
TOTAL CELLS COUNTED SPEC: 100
TSH SERPL DL<=0.05 MIU/L-ACNC: 1.83 UIU/ML (ref 0.36–3.74)
WBC # BLD AUTO: 5.89 THOUSAND/UL (ref 4.31–10.16)

## 2018-04-08 PROCEDURE — 59409 OBSTETRICAL CARE: CPT | Performed by: OBSTETRICS & GYNECOLOGY

## 2018-04-08 PROCEDURE — 82805 BLOOD GASES W/O2 SATURATION: CPT | Performed by: OBSTETRICS & GYNECOLOGY

## 2018-04-08 PROCEDURE — 85610 PROTHROMBIN TIME: CPT | Performed by: PHYSICIAN ASSISTANT

## 2018-04-08 PROCEDURE — 86901 BLOOD TYPING SEROLOGIC RH(D): CPT | Performed by: PHYSICIAN ASSISTANT

## 2018-04-08 PROCEDURE — 80053 COMPREHEN METABOLIC PANEL: CPT | Performed by: PHYSICIAN ASSISTANT

## 2018-04-08 PROCEDURE — 86900 BLOOD TYPING SEROLOGIC ABO: CPT | Performed by: OBSTETRICS & GYNECOLOGY

## 2018-04-08 PROCEDURE — 84443 ASSAY THYROID STIM HORMONE: CPT | Performed by: PHYSICIAN ASSISTANT

## 2018-04-08 PROCEDURE — 36415 COLL VENOUS BLD VENIPUNCTURE: CPT | Performed by: PHYSICIAN ASSISTANT

## 2018-04-08 PROCEDURE — 99285 EMERGENCY DEPT VISIT HI MDM: CPT

## 2018-04-08 PROCEDURE — 88307 TISSUE EXAM BY PATHOLOGIST: CPT | Performed by: PATHOLOGY

## 2018-04-08 PROCEDURE — 85730 THROMBOPLASTIN TIME PARTIAL: CPT | Performed by: PHYSICIAN ASSISTANT

## 2018-04-08 PROCEDURE — 85007 BL SMEAR W/DIFF WBC COUNT: CPT | Performed by: PHYSICIAN ASSISTANT

## 2018-04-08 PROCEDURE — 96365 THER/PROPH/DIAG IV INF INIT: CPT

## 2018-04-08 PROCEDURE — 85027 COMPLETE CBC AUTOMATED: CPT | Performed by: PHYSICIAN ASSISTANT

## 2018-04-08 PROCEDURE — 86592 SYPHILIS TEST NON-TREP QUAL: CPT | Performed by: OBSTETRICS & GYNECOLOGY

## 2018-04-08 PROCEDURE — 80307 DRUG TEST PRSMV CHEM ANLYZR: CPT | Performed by: OBSTETRICS & GYNECOLOGY

## 2018-04-08 PROCEDURE — 96375 TX/PRO/DX INJ NEW DRUG ADDON: CPT

## 2018-04-08 PROCEDURE — 86850 RBC ANTIBODY SCREEN: CPT | Performed by: OBSTETRICS & GYNECOLOGY

## 2018-04-08 PROCEDURE — 86900 BLOOD TYPING SEROLOGIC ABO: CPT | Performed by: PHYSICIAN ASSISTANT

## 2018-04-08 PROCEDURE — 86901 BLOOD TYPING SEROLOGIC RH(D): CPT | Performed by: OBSTETRICS & GYNECOLOGY

## 2018-04-08 PROCEDURE — 83735 ASSAY OF MAGNESIUM: CPT | Performed by: PHYSICIAN ASSISTANT

## 2018-04-08 PROCEDURE — 86850 RBC ANTIBODY SCREEN: CPT | Performed by: PHYSICIAN ASSISTANT

## 2018-04-08 RX ORDER — DOCUSATE SODIUM 100 MG/1
100 CAPSULE, LIQUID FILLED ORAL 2 TIMES DAILY
Status: DISCONTINUED | OUTPATIENT
Start: 2018-04-08 | End: 2018-04-10 | Stop reason: HOSPADM

## 2018-04-08 RX ORDER — ONDANSETRON 2 MG/ML
4 INJECTION INTRAMUSCULAR; INTRAVENOUS EVERY 8 HOURS PRN
Status: DISCONTINUED | OUTPATIENT
Start: 2018-04-08 | End: 2018-04-10 | Stop reason: HOSPADM

## 2018-04-08 RX ORDER — OXYTOCIN/RINGER'S LACTATE 30/500 ML
PLASTIC BAG, INJECTION (ML) INTRAVENOUS
Status: COMPLETED
Start: 2018-04-08 | End: 2018-04-08

## 2018-04-08 RX ORDER — OXYTOCIN/RINGER'S LACTATE 30/500 ML
1-30 PLASTIC BAG, INJECTION (ML) INTRAVENOUS
Status: DISCONTINUED | OUTPATIENT
Start: 2018-04-08 | End: 2018-04-10 | Stop reason: HOSPADM

## 2018-04-08 RX ORDER — SODIUM CHLORIDE, SODIUM LACTATE, POTASSIUM CHLORIDE, CALCIUM CHLORIDE 600; 310; 30; 20 MG/100ML; MG/100ML; MG/100ML; MG/100ML
125 INJECTION, SOLUTION INTRAVENOUS CONTINUOUS
Status: DISCONTINUED | OUTPATIENT
Start: 2018-04-08 | End: 2018-04-10 | Stop reason: HOSPADM

## 2018-04-08 RX ORDER — OXYCODONE HYDROCHLORIDE AND ACETAMINOPHEN 5; 325 MG/1; MG/1
2 TABLET ORAL EVERY 4 HOURS PRN
Status: DISCONTINUED | OUTPATIENT
Start: 2018-04-08 | End: 2018-04-10 | Stop reason: HOSPADM

## 2018-04-08 RX ORDER — BUTORPHANOL TARTRATE 1 MG/ML
1 INJECTION, SOLUTION INTRAMUSCULAR; INTRAVENOUS ONCE
Status: COMPLETED | OUTPATIENT
Start: 2018-04-08 | End: 2018-04-08

## 2018-04-08 RX ORDER — DIPHENHYDRAMINE HCL 25 MG
25 TABLET ORAL EVERY 6 HOURS PRN
Status: DISCONTINUED | OUTPATIENT
Start: 2018-04-08 | End: 2018-04-10 | Stop reason: HOSPADM

## 2018-04-08 RX ORDER — ACETAMINOPHEN 325 MG/1
650 TABLET ORAL EVERY 6 HOURS PRN
Status: DISCONTINUED | OUTPATIENT
Start: 2018-04-08 | End: 2018-04-10 | Stop reason: HOSPADM

## 2018-04-08 RX ORDER — IBUPROFEN 600 MG/1
600 TABLET ORAL EVERY 6 HOURS PRN
Status: DISCONTINUED | OUTPATIENT
Start: 2018-04-08 | End: 2018-04-10 | Stop reason: HOSPADM

## 2018-04-08 RX ORDER — CALCIUM CARBONATE 200(500)MG
1000 TABLET,CHEWABLE ORAL DAILY PRN
Status: DISCONTINUED | OUTPATIENT
Start: 2018-04-08 | End: 2018-04-10 | Stop reason: HOSPADM

## 2018-04-08 RX ORDER — ONDANSETRON 2 MG/ML
4 INJECTION INTRAMUSCULAR; INTRAVENOUS EVERY 6 HOURS PRN
Status: DISCONTINUED | OUTPATIENT
Start: 2018-04-08 | End: 2018-04-08

## 2018-04-08 RX ORDER — DIAPER,BRIEF,INFANT-TODD,DISP
1 EACH MISCELLANEOUS 4 TIMES DAILY PRN
Status: DISCONTINUED | OUTPATIENT
Start: 2018-04-08 | End: 2018-04-10 | Stop reason: HOSPADM

## 2018-04-08 RX ORDER — OXYCODONE HYDROCHLORIDE AND ACETAMINOPHEN 5; 325 MG/1; MG/1
1 TABLET ORAL EVERY 4 HOURS PRN
Status: DISCONTINUED | OUTPATIENT
Start: 2018-04-08 | End: 2018-04-10 | Stop reason: HOSPADM

## 2018-04-08 RX ADMIN — IBUPROFEN 600 MG: 600 TABLET ORAL at 22:07

## 2018-04-08 RX ADMIN — SODIUM CHLORIDE 5 MILLION UNITS: 0.9 INJECTION, SOLUTION INTRAVENOUS at 10:11

## 2018-04-08 RX ADMIN — OXYCODONE HYDROCHLORIDE AND ACETAMINOPHEN 2 TABLET: 5; 325 TABLET ORAL at 12:54

## 2018-04-08 RX ADMIN — SODIUM CHLORIDE, SODIUM LACTATE, POTASSIUM CHLORIDE, AND CALCIUM CHLORIDE 999 ML/HR: .6; .31; .03; .02 INJECTION, SOLUTION INTRAVENOUS at 11:44

## 2018-04-08 RX ADMIN — Medication 250 UNITS: at 11:54

## 2018-04-08 RX ADMIN — BUTORPHANOL TARTRATE 1 MG: 1 INJECTION, SOLUTION INTRAMUSCULAR; INTRAVENOUS at 10:36

## 2018-04-08 RX ADMIN — BENZOCAINE AND LEVOMENTHOL 1 APPLICATION: 200; 5 SPRAY TOPICAL at 12:54

## 2018-04-08 NOTE — ED NOTES
Pt feeling vaginal pressure and nausea  Dr Tone Martinez made aware       Raghu Ashton, RN  04/08/18 8024

## 2018-04-08 NOTE — ED PROVIDER NOTES
History  Chief Complaint   Patient presents with    Laboring     Water broke  10 min ago  Third pregnancy  Patient is a 37-year-old female that reports to the emergency department after her water broke  No chest pain or sob  Normal heart and lung exam      Some achi lower abdominal pressure  Fetal heart rate at 140  Patient notes that in the past she gave birth about 1 5 hours after giving birth  She notes intermittent abdominal cramping sensation  MDM 34year old female, water broke, concern for imminent delivery despite closed cervix, will arrange emergent transfer  Prior to Admission Medications   Prescriptions Last Dose Informant Patient Reported? Taking? PRENATAL 27-1 MG TABS  Self Yes Yes   Sig: Take 1 tablet by mouth daily     ferrous sulfate 324 (65 Fe) mg  Self No Yes   Sig: Take 1 tablet (324 mg total) by mouth 2 (two) times a day before meals   Patient not taking: Reported on 4/8/2018       Facility-Administered Medications: None       Past Medical History:   Diagnosis Date    Anemia in pregnancy     History of chlamydia infection     Hx of gonorrhea        Past Surgical History:   Procedure Laterality Date    DILATION AND EVACUATION      two procedures     OTHER SURGICAL HISTORY      IUD removal        Family History   Problem Relation Age of Onset    No Known Problems Mother     No Known Problems Father      I have reviewed and agree with the history as documented  Social History   Substance Use Topics    Smoking status: Never Smoker    Smokeless tobacco: Never Used    Alcohol use No        Review of Systems   Constitutional: Negative for chills and fever  Gastrointestinal: Positive for abdominal pain  Genitourinary: Positive for vaginal discharge         Physical Exam  ED Triage Vitals   Temp Pulse Respirations Blood Pressure SpO2   -- 04/08/18 0938 04/08/18 0938 04/08/18 0938 04/08/18 0938    (!) 132 20 120/76 100 %      Temp src Heart Rate Source Patient Position - Orthostatic VS BP Location FiO2 (%)   -- 04/08/18 0938 04/08/18 0938 04/08/18 0938 --    Monitor Lying Right arm       Pain Score       04/08/18 0945       3           Orthostatic Vital Signs  Vitals:    04/08/18 1000 04/08/18 1013 04/08/18 1025 04/08/18 1031   BP: 116/78 104/73 105/73 107/77   Pulse: (!) 120 103 (!) 106 (!) 106   Patient Position - Orthostatic VS:  Sitting Sitting Sitting       Physical Exam   Constitutional: She is oriented to person, place, and time  She appears well-developed and well-nourished  HENT:   Head: Normocephalic and atraumatic  Right Ear: External ear normal    Left Ear: External ear normal    Eyes: Conjunctivae and EOM are normal    Neck: Normal range of motion  Neck supple  No JVD present  No tracheal deviation present  Cardiovascular: Normal rate, regular rhythm and normal heart sounds  Pulmonary/Chest: Effort normal  No respiratory distress  She has no wheezes  She has no rales  Abdominal: Soft  Bowel sounds are normal  There is tenderness  There is no rebound and no guarding  Patient pregnant    Genitourinary:   Genitourinary Comments: Large amount of fluid pooling that came from vagina, scant blood, cervix closed   Musculoskeletal: She exhibits no edema or tenderness  Neurological: She is alert and oriented to person, place, and time  Skin: Skin is warm and dry  No rash noted  No erythema  Psychiatric: She has a normal mood and affect  Thought content normal    Nursing note and vitals reviewed        ED Medications  Medications   penicillin G (PFIZERPEN-G) 5 Million Units in sodium chloride 0 9 % 250 mL IVPB (0 Million Units Intravenous Stopped 4/8/18 1101)   butorphanol (STADOL) injection 1 mg (1 mg Intravenous Given 4/8/18 1036)       Diagnostic Studies  Results Reviewed     Procedure Component Value Units Date/Time    CBC and differential [[id number]]  (Abnormal) Collected:  04/08/18 0953    Lab Status:  Final result Specimen:  Blood from Arm, Left Updated:  04/08/18 1030     WBC 5 89 Thousand/uL      RBC 3 77 (L) Million/uL      Hemoglobin 10 1 (L) g/dL      Hematocrit 31 0 (L) %      MCV 82 fL      MCH 26 8 pg      MCHC 32 6 g/dL      RDW 14 5 %      MPV 11 1 fL      Platelets 797 (L) Thousands/uL     Narrative: This is an appended report  These results have been appended to a previously verified report  Comprehensive metabolic panel [[id number]]  (Abnormal) Collected:  04/08/18 0953    Lab Status:  Final result Specimen:  Blood from Arm, Left Updated:  04/08/18 1023     Sodium 138 mmol/L      Potassium 3 3 (L) mmol/L      Chloride 105 mmol/L      CO2 25 mmol/L      Anion Gap 8 mmol/L      BUN 11 mg/dL      Creatinine 0 64 mg/dL      Glucose 81 mg/dL      Calcium 8 4 mg/dL      AST 16 U/L      ALT 14 U/L      Alkaline Phosphatase 171 (H) U/L      Total Protein 6 4 g/dL      Albumin 2 6 (L) g/dL      Total Bilirubin 0 30 mg/dL      eGFR 142 ml/min/1 73sq m     Narrative:         National Kidney Disease Education Program recommendations are as follows:  GFR calculation is accurate only with a steady state creatinine  Chronic Kidney disease less than 60 ml/min/1 73 sq  meters  Kidney failure less than 15 ml/min/1 73 sq  meters  Magnesium [[id number]]  (Normal) Collected:  04/08/18 0953    Lab Status:  Final result Specimen:  Blood from Arm, Left Updated:  04/08/18 1023     Magnesium 1 8 mg/dL     TSH [[id number]]  (Normal) Collected:  04/08/18 0953    Lab Status:  Final result Specimen:  Blood from Arm, Left Updated:  04/08/18 1023     TSH 3RD GENERATON 1 828 uIU/mL     Narrative:         Patients undergoing fluorescein dye angiography may retain small amounts of fluorescein in the body for 48-72 hours post procedure  Samples containing fluorescein can produce falsely depressed TSH values  If the patient had this procedure,a specimen should be resubmitted post fluorescein clearance            The recommended reference ranges for TSH during pregnancy are as follows:  First trimester 0 1 to 2 5 uIU/mL  Second trimester  0 2 to 3 0 uIU/mL  Third trimester 0 3 to 3 0 uIU/m      Protime-INR [[id number]]  (Normal) Collected:  04/08/18 0953    Lab Status:  Final result Specimen:  Blood from Arm, Left Updated:  04/08/18 1012     Protime 12 8 seconds      INR 0 97    APTT [[id number]]  (Normal) Collected:  04/08/18 0953    Lab Status:  Final result Specimen:  Blood from Arm, Left Updated:  04/08/18 1012     PTT 30 seconds     Narrative: Therapeutic Heparin Range = 60-90 seconds                 No orders to display              Procedures  Procedures       Phone Contacts  ED Phone Contact    ED Course  ED Course as of Apr 16 2137   Sun Apr 08, 2018   0941 Josue    0956 FHR of 140, sterile vaginal with closed cervical os, spoke with Josue and Talat Machuca will send patient asap    1025 Stadol 1mg    1038 FHR still reassuring, cervix still closed, discussed once again with Josue, discussed plan with ambulance team, will transfer to [address] Was able to speak with John Keita with [facility] ambulance, will send the patient urgently                                   [facility]  [facility] Time    Disposition  Final diagnoses:   Labor abnormal   Rupture of membranes with clear amniotic fluid     Time reflects when diagnosis was documented in both MDM as applicable and the Disposition within this note     Time User Action Codes Description Comment    4/16/2018  9:37 PM Steve Louise Add [O62 [id number]] Labor abnormal     4/16/2018  9:37 PM Steve Louise Add [O42 [id number]] Rupture of membranes with clear amniotic fluid       ED Disposition     ED Disposition Condition Comment    Transfer to Another [address] [place] [address] should be transferred out to [address] [place] [address] Most Recent Value   Patient Condition  The patient has been stabilized such that within reasonable medical probability, no material deterioration of the patient condition or the condition of the unborn child(luisa) is likely to result from the transfer   Reason for Transfer  Level of Care needed not available at this facility   Benefits of Transfer  Specialized equipment and/or services available at the receiving facility (Include comment)________________________ Courtney Rodriguez Gyn]   Risks of Transfer  Potential for delay in receiving treatment, Potential deterioration of medical condition, Increased discomfort during transfer, Loss of IV, Possible worsening of condition or death during transfer   Accepting Physician  [address] Name, Selene Spence   Sending MD Charles Douglass   Provider Certification  General risk, such as traffic hazards, adverse weather conditions, rough terrain or turbulence, possible failure of equipment (including vehicle or aircraft), or consequences of actions of persons outside the control of the transport personnel, Unanticipated needs of medical equipment and personnel during transport, Risk of worsening condition [possibility that patient may give birth en route]      RN Documentation    Flowsheet Row Most [address] [facility] Name, Selene Spence   Bed Assignment  326   Report Given to  PIA FIGUEROA      Follow-up Information    None       Discharge Medication List as of 4/8/2018 11:04 AM      CONTINUE these medications which have NOT CHANGED    Details   ferrous sulfate 324 (65 Fe) mg Take 1 tablet (324 mg total) by mouth 2 (two) times a day before meals, Starting Wed 3/21/2018, Normal      PRENATAL 27-1 MG TABS Take by mouth daily, Historical Med      docusate sodium (COLACE) 100 mg capsule Take 1 capsule (100 mg total) by mouth 2 (two) times a day As needed for constipation, Starting Wed 3/21/2018, Normal           No discharge procedures on file      ED Provider  Electronically Signed by           Charlie Lindo MD  04/16/18 2137       Charlie Lindo MD  04/16/18 2138

## 2018-04-08 NOTE — H&P
History & Physical - OB/GYN   Parrsi Barnhart 34 y o  female MRN: [id number]  Unit/Bed#: L&D 326-01 Encounter: [id number]    81 y o  [id number] at 39w4d by LMP  She is a patient of [facility]    Chief complaint:  Labor, Rupture of Membranes    Patient initially presented to [address] [place] [address],[address] with complaint of contractions, rupture of membranes  As emergency providers deemed her stable based on minimal cervical dilation of digital exam, she was transferred to [facility]  She did receive initial dose of PCN for GBS + status      Pregnancy complicated by poor prenatal care-last prenatal visit occurring in 2018, history of  delivery-twins at 36w5d,      HPI:  Contractions:  Yes, every 2 minutes  Fetal movement:  yes  Vaginal bleeding:   yes  Leaking of fluid:  yes      Pregnancy Complications:  Patient Active Problem List   Diagnosis    39 weeks gestation of pregnancy    History of  delivery    Short cervix affecting pregnancy    Vulvovaginal candidiasis    Group beta Strep positive    Anemia during pregnancy in third trimester       PMH:  Past Medical History:   Diagnosis Date    Anemia in pregnancy     History of chlamydia infection     Hx of gonorrhea        PSH:  Past Surgical History:   Procedure Laterality Date    DILATION AND EVACUATION      two procedures     OTHER SURGICAL HISTORY      IUD removal        Social Hx:      OB Hx:  Obstetric History       T1      L3     SAB0   TAB2   Ectopic0   Multiple1   Live Births3       # Outcome Date GA Lbr Aron/2nd Weight Sex Delivery Anes PTL Lv   5 Current            4A  02/25/15 36w5d  2551 g (5 lb 10 oz) M Vag-Spont   [facility]      Complications: Precipitous delivery   4B   36w5d  2381 g (5 lb 4 oz) F Vag-Spont   [facility]   3 TAB  12w0d          2 TAB  12w0d          1 Term 10/15/07 40w0d  3629 g (8 lb) F Vag-Spont None  [facility]          Meds:  No current facility-administered medications on file prior to encounter  Current Outpatient Prescriptions on File Prior to Encounter   Medication Sig Dispense Refill    PRENATAL 27-1 MG TABS Take by mouth daily         Allergies:  No Known Allergies    Labs:  Blood type: O+  Antibody: negative  Group B strep: positive  HIV: negative  Hepatitis B: negative  RPR: negative  Rubella: Immune  Varicella Unknown  1 hour Glucose: did not complete    Physical Exam:  /60 (BP Location: Left arm)   Pulse 96   Temp 98 1 °F (36 7 °C) (Oral)   Resp 20   LMP 07/10/2017 (Approximate)     Physical Exam   Constitutional: She is oriented to person, place, and time  She appears well-developed and well-nourished  She appears distressed  Cardiovascular: Normal rate, regular rhythm and normal heart sounds  Pulmonary/Chest: Effort normal and breath sounds normal    Abdominal: Soft  Bowel sounds are normal  She exhibits distension  Gravid     Neurological: She is alert and oriented to person, place, and time  Estimated Fetal Weight: 7 lbs  Presentation: vertex    SVE: 8 / 90% / 0  FHT:  130 / Moderate 6 - 25 bpm / + accelerations, no decelerations  Ambia: q 2 minutes    Membranes: grossly ruptured    Assessment:   34 y o  [id number] at 39w4d in labor    Plan:   1  Admit to L&D  2  CBC, RPR, type and screen  3  Poor prenatal care: f/u UDS  4   FEN: clear liquid diet, LR IVF    Epidural upon request    Discussed with Dr Sherita Hall DO

## 2018-04-08 NOTE — ED NOTES
Complaining of abdominal tighting    fht's 136 llq strong and regular     Nazia Cruz RN  04/08/18 1021

## 2018-04-08 NOTE — DISCHARGE SUMMARY
Discharge Summary - OB/GYN   Moria Sandy 34 y o  female MRN: [id number]  Unit/Bed#: L&D 326-01 Encounter: [id number]      Admission Date: 2018     Discharge Date: 4/10/18    Admitting Diagnosis:   1  39w4d pregnancy  2  GBS positive status  3  Iron deficiency anemia  4  Poor prenatal care    Discharge Diagnosis:   Same, delivered      Procedures: spontaneous vaginal delivery    Delivering Attending: Marya Winchester MD   Attending at time of discharge: Terrance Dawson MD      Hospital Course:     Moira Sandy is a 34 y o  [id number] at 39w4d wks who was initially admitted for labor  Patient initially presented to [address] [place] [address],[address] with complaint of contractions, rupture of membranes  As emergency providers deemed her stable based on minimal cervical dilation of digital exam, she was transferred to [address] [place] [address]  She did receive initial dose of PCN for GBS + status  [facility] she was found to be grossly ruptured with cervical exam /  Patient subsequently reached complete cervical dilation  She delivered a viable male  on 2018 at 1149  Weight 7lbs 14oz via spontaneous vaginal delivery  Apgars were 8 (1 min) and 9 (5 min)   was transferred to  nursery  Patient tolerated the procedure well  Her postpartum course was uncomplicated  Her postpartum pain was well controlled with oral analgesics  On day of discharge, she was ambulating and able to reasonably perform all ADLs  She was voiding and had appropriate bowel function  Pain was well controlled  She was discharged home on postpartum day #2 without complications  Patient was instructed to follow up with her OB as an outpatient and was given appropriate warnings to call provider if she develops signs of infection or uncontrolled pain      Complications: none apparent    Condition at discharge: good     Discharge instructions/Information to patient and family:   See after visit summary for information provided to patient and family  Provisions for Follow-Up Care:  See after visit summary for information related to follow-up care and any pertinent home health orders  Disposition: See After Visit Summary for discharge disposition information  Planned Readmission: No    Discharge Medications: For a complete list of the patient's medications, please refer to her med rec      Moon Clemons DO

## 2018-04-08 NOTE — L&D DELIVERY NOTE
Delivery Summary - OB/GYN   Raeann Rhoades 34 y o  female MRN: [id number]  Unit/Bed#: L&D 326-01 Encounter: [id number]    Pre-delivery Diagnosis:   1  39w4d pregnancy  2  GBS positive status  3  Iron deficiency anemia  4  Poor prenatal care    Post-delivery Diagnosis: same, delivered    Attending: Natali Edward MD    Assistant(s): Alphonso CHAN    Procedure: Spontaneous Vaginal Delivery    Anesthesia: None    Estimated Blood Loss:  350 mL    Specimens:   1  Arterial and venous cord gases  2  Cord blood  3  Segment of umbilical cord  4  Placenta to pathology    Complications:  None apparent    Findings:  1  Viable male  delivered on 18 at 1149 weighing 7lbs 14oz; Apgar scores of 8 at one minute and 9 at five minutes  2  Spontaneous delivery of placenta with centrally inserted 3-vessel cord  3  Arterial and venous cord gases of 7 257 and 7 330, respectively  4  Bilateral paralabial lacerations, not requiring repair       Disposition: Patient tolerated the procedure well and was recovering in labor and delivery room with family and  before being transferred to the post-partum floor  Procedure Details     Description of procedure    After pushing for 1 minutes, on 18 at 1149 patient delivered a viable male , weighing 3572 g, Apgars of 8 (1 min) and 9 (5 min)  The fetal vertex delivered spontaneously  There was no nuchal cord  The anterior shoulder delivered atraumatically with maternal expulsive forces and the assistance of downward traction  The posterior shoulder delivered with maternal expulsive forces and the assistance of upward traction  The remainder of the fetus delivered spontaneously  Upon delivery, the infant was placed on the mothers abdomen and the cord was clamped and cut  The infant was noted to cry spontaneously and was moving all extremities appropriately  There was no evidence for injury  Awaiting nurse resuscitators evaluated the  at bedside   Arterial and venous cord blood gases and cord blood was collected for analysis  These were promptly sent to the lab  In the immediate post-partum, 30 units of IV pitocin was administered and the uterus was noted to contract down well with massage and pitocin  The placenta delivered spontaneously at 1154 and was noted to have a centrally inserted 3 vessel cord  The vagina, cervix, and perineum were inspected and there was noted to be bilateral paralabial lacerations, both hemostatic and neither requiring repair  At the conclusion of the delivery, all needle, sponge, and instrument counts were noted to be correct  Patient tolerated the procedure well and was allowed to recover in labor and delivery room with family and  before being transferred to the post-partum floor  Dr Leopoldo Saldana was present and participated in all key portions of the case      [place], DO

## 2018-04-08 NOTE — PROGRESS NOTES
Patient arrives to room 326, accompanied by EMTs & family  EMTs report that patient was transferred, via ambulance, from [facility] is in labor  Patient appears uncomfortable & is complaining of contractions every 2 minutes & leaking of clear, watery fluid  Dr Banerjee Card is aware that patient has arrived  Will apply EFM; please see labor flowsheet

## 2018-04-08 NOTE — DISCHARGE INSTRUCTIONS
Vaginal Delivery   WHAT YOU SHOULD KNOW:   A vaginal delivery is the birth of your baby through your vagina (birth canal)  AFTER YOU LEAVE:   Medicines:  · NSAIDs  help decrease swelling and pain or fever  This medicine is available with or without a doctor's order  NSAIDs can cause stomach bleeding or kidney problems in certain people  If you take blood thinner medicine, always ask your healthcare provider if NSAIDs are safe for you  Always read the medicine label and follow directions  · Take your medicine as directed  Call your healthcare provider if you think your medicine is not helping or if you have side effects  Tell him if you are allergic to any medicine  Keep a list of the medicines, vitamins, and herbs you take  Include the amounts, and when and why you take them  Bring the list or the pill bottles to follow-up visits  Carry your medicine list with you in case of an emergency  Follow up with your primary healthcare provider:  Most women need to return 6 weeks after a vaginal delivery  Ask about how to care for your wounds or stitches  Write down your questions so you remember to ask them during your visits  Activity:  Rest as much as possible  Try to keep all activities short  You may be able to do some exercise soon after you have your baby  Talk with your primary healthcare provider before you start exercising  If you work outside the home, ask when you can return to your job  Kegel exercises:  Kegel exercises may help your vaginal and rectal muscles heal faster  You can do Kegel exercises by tightening and relaxing the muscles around your vagina  Kegel exercises help make the muscles stronger  Breast care:  When your milk comes in, your breasts may feel full and hard  Ask how to care for your breasts, even if you are not breastfeeding  Constipation:  Do not try to push the bowel movement out if it is too hard   High-fiber foods, extra liquids, and regular exercise can help you prevent constipation  Examples of high-fiber foods are fruit and bran  Prune juice and water are good liquids to drink  Regular exercise helps your digestive system work  You may also be told to take over-the-counter fiber and stool softener medicines  Take these items as directed  Hemorrhoids:  Pregnancy can cause severe hemorrhoids  You may have rectal pain because of the hemorrhoids  Ask how to prevent or treat hemorrhoids  Perineum care: Your perineum is the area between your vagina and anus  Keep the area clean and dry to help it heal and to prevent infection  Wash the area gently with soap and water when you bathe or shower  Rinse your perineum with warm water when you use the toilet  Your primary healthcare provider may suggest you use a warm sitz bath to help decrease pain  A sitz bath is a bathtub or basin filled to hip level  Stay in the sitz bath for 20 to 30 minutes, or as directed  Vaginal discharge: You will have vaginal discharge, called lochia, after your delivery  The lochia is bright red the first day or two after the birth  By the fourth day, the amount decreases, and it turns red-brown  Use a sanitary pad rather than a tampon to prevent a vaginal infection  It is normal to have lochia up to 8 weeks after your baby is born  Monthly periods: Your period may start again within 7 to 12 weeks after your baby is born  If you are breastfeeding, it may take longer for your period to start again  You can still get pregnant again even though you do not have your monthly period  Talk with your primary healthcare provider about a birth control method that will be good for you if you do not want to get pregnant  Mood changes: Many new mothers have some kind of mood changes after delivery  Some of these changes occur because of lack of sleep, hormone changes, and caring for a new baby  Some mood changes can be more serious, such as postpartum depression   Talk with your primary healthcare provider if you feel unable to care for yourself or your baby  Sexual activity:  You may need to avoid sex for 6 to 7 weeks after you have your baby  You may notice you have a decreased desire for sex, or sex may be painful  You may need to use a vaginal lubricant (gel) to help make sex more comfortable  Contact your primary healthcare provider if:   · You have heavy vaginal bleeding that fills 1 or more sanitary pads in 1 hour  · You have a fever  · Your pain does not go away, or gets worse  · The skin between your vagina and rectum is swollen, warm, or red  · You have swollen, hard, or painful breasts  · You feel very sad or depressed  · You feel more tired than usual      · You have questions or concerns about your condition or care  Seek care immediately or call 911 if:   · You have pus or yellow drainage coming from your vagina or wound  · You are urinating very little, or not at all  · Your arm or leg feels warm, tender, and painful  It may look swollen and red  · You feel lightheaded, have sudden and worsening chest pain, or trouble breathing  You may have more pain when you take deep breaths or cough, or you may cough up blood  © 2014 [address] is for End User's use only and may not be sold, redistributed or otherwise used for commercial purposes  All illustrations and images included in CareNotes® are the copyrighted property of [employer]  or Tamir Vasquez  The above information is an  only  It is not intended as medical advice for individual conditions or treatments  Talk to your doctor, nurse or pharmacist before following any medical regimen to see if it is safe and effective for you

## 2018-04-08 NOTE — EMTALA/ACUTE CARE TRANSFER
[address] [place] [address]  Dept: [phone]      VNZVLI TRANSFER CONSENT    NAME Isis Desir                                         1990                              MRN [id number]    I have been informed of my rights regarding examination, treatment, and transfer   by Dr Yvonne Elias MD    Benefits: Specialized equipment and/or services available at the receiving facility (Include comment)________________________ (Ob Gyn)    Risks: Potential for delay in receiving treatment, Potential deterioration of medical condition, Increased discomfort during transfer, Loss of IV, Possible worsening of condition or death during transfer      Transfer Request   I acknowledge that my medical condition has been evaluated and explained to me by the emergency department physician or other qualified medical person and/or my attending physician who has recommended and offered to me further medical examination and treatment  I understand the Hospital's obligation with respect to the treatment and stabilization of my emergency medical condition  I nevertheless request to be transferred  I release the Hospital, the doctor, and any other persons caring for me from all responsibility or liability for any injury or ill effects that may result from my transfer and agree to accept all responsibility for the consequences of my choice to transfer, rather than receive stabilizing treatment at the Hospital  I understand that because the transfer is my request, my insurance may not provide reimbursement for the services  The Hospital will assist and direct me and my family in how to make arrangements for transfer, but the hospital is not liable for any fees charged by the transport service    In spite of this understanding, I refuse to consent to further medical examination and treatment which has been offered to me, and request transfer to  [place]  Name, [facility] : [facility]  I authorize the performance of emergency medical procedures and treatments upon me in both transit and upon arrival at the receiving facility  Additionally, I authorize the release of any and all medical records to the receiving facility and request they be transported with me, if possible  I authorize the performance of emergency medical procedures and treatments upon me in both transit and upon arrival at the receiving facility  Additionally, I authorize the release of any and all medical records to the receiving facility and request they be transported with me, if possible  I understand that the safest mode of transportation during a medical emergency is an ambulance and that the Hospital advocates the use of this mode of transport  Risks of traveling to the receiving facility by car, including absence of medical control, life sustaining equipment, such as oxygen, and medical personnel has been explained to me and I fully understand them  (MARY CORRECT BOX BELOW)  [  ]  I consent to the stated transfer and to be transported by ambulance/helicopter  [  ]  I consent to the stated transfer, but refuse transportation by ambulance and accept full responsibility for my transportation by car  I understand the risks of non-ambulance transfers and I exonerate the Hospital and its staff from any deterioration in my condition that results from this refusal     X___________________________________________    DATE  18  TIME________  Signature of patient or legally responsible individual signing on patient behalf           RELATIONSHIP TO PATIENT_________________________          Provider Certification    NAME Trinidad Park                                         1990                              MRN [id number]    A medical screening exam was performed on the above named patient    Based on the examination:    Condition Necessitating Transfer There were no encounter diagnoses  Patient Condition: The patient has been stabilized such that within reasonable medical probability, no material deterioration of the patient condition or the condition of the unborn child(luisa) is likely to result from the transfer    Reason for Transfer: Level of Care needed not available at this facility    Transfer Requirements: [address]   · Space available and qualified personnel available for treatment as acknowledged by    · Agreed to accept transfer and to provide appropriate medical treatment as acknowledged by       [facility]  · Appropriate medical records of the examination and treatment of the patient are provided at the time of transfer   [address] _______  · Transfer will be performed by qualified personnel from    and appropriate transfer equipment as required, including the use of necessary and appropriate life support measures      Provider Certification: I have examined the patient and explained the following risks and benefits of being transferred/refusing transfer to the patient/family:  General risk, such as traffic hazards, adverse weather conditions, rough terrain or turbulence, possible failure of equipment (including vehicle or aircraft), or consequences of actions of persons outside the control of the transport personnel, Unanticipated needs of medical equipment and personnel during transport, Risk of worsening condition (possibility that patient may give birth en route)      Based on these reasonable risks and benefits to the patient and/or the unborn child(luisa), and based upon the information available at the time of the patients examination, I certify that the medical benefits reasonably to be expected from the provision of appropriate medical treatments at another medical facility outweigh the increasing risks, if any, to the individuals medical condition, and in the case of labor to the unborn child, from effecting the transfer      X____________________________________________ DATE 04/08/18        TIME_______      ORIGINAL - SEND TO MEDICAL RECORDS   COPY - SEND WITH PATIENT DURING TRANSFER

## 2018-04-09 LAB — RPR SER QL: NORMAL

## 2018-04-09 PROCEDURE — 99024 POSTOP FOLLOW-UP VISIT: CPT | Performed by: OBSTETRICS & GYNECOLOGY

## 2018-04-09 RX ORDER — MEDROXYPROGESTERONE ACETATE 150 MG/ML
150 INJECTION, SUSPENSION INTRAMUSCULAR ONCE
Status: COMPLETED | OUTPATIENT
Start: 2018-04-09 | End: 2018-04-09

## 2018-04-09 RX ADMIN — OXYCODONE HYDROCHLORIDE AND ACETAMINOPHEN 1 TABLET: 5; 325 TABLET ORAL at 20:13

## 2018-04-09 RX ADMIN — MEDROXYPROGESTERONE ACETATE 150 MG: 150 INJECTION, SUSPENSION, EXTENDED RELEASE INTRAMUSCULAR at 13:59

## 2018-04-09 RX ADMIN — OXYCODONE HYDROCHLORIDE AND ACETAMINOPHEN 1 TABLET: 5; 325 TABLET ORAL at 00:26

## 2018-04-09 RX ADMIN — OXYCODONE HYDROCHLORIDE AND ACETAMINOPHEN 1 TABLET: 5; 325 TABLET ORAL at 13:56

## 2018-04-09 RX ADMIN — OXYCODONE HYDROCHLORIDE AND ACETAMINOPHEN 1 TABLET: 5; 325 TABLET ORAL at 06:28

## 2018-04-09 NOTE — PROGRESS NOTES
Postpartum Progress Note - OB/GYN   Fariba Arrieta 34 y o  female MRN: [id number]  Unit/Bed#: L&D 306-01 Encounter: [id number]    ASSESSMENT:  Fariba Arrieta 34 y o  [id number] s/p Spontaneous Vaginal Delivery Postpartum day  1  Pt is well appearing and with no current complaints  PLAN:  1) Continue routine postpartum care  2) Encourage ambulation  3) Pain control as needed  4) Advance diet as tolerated  5) Poor PNC: follow up CM  6) Dispo: stable and comfortable, depo for contraception    Subjective/Objective     SUBJECTIVE:  Pain: no  Tolerating Oral Intake: yes   Voiding: yes  Flatus: yes  Bowel Movement: no  Ambulating: yes  Breastfeeding: yes  Chest Pain: no  Shortness of Breath: no  Leg Pain/Discomfort: no  Lochia: minimal    OBJECTIVE:     Vitals: Blood pressure 106/59, pulse 86, temperature 98 8 °F (37 1 °C), temperature source Oral, resp  rate 16, height 5' 5" (1 651 m), weight 65 8 kg (145 lb), last menstrual period 07/10/2017, not currently breastfeeding       General: appears well, alert and oriented x 3, pleasant and cooperative  Cardiovascular: RRR, normal S1/S2, no GRM  Lungs:  clear to auscultation bilaterally; no WRR, non-labored breathing   Abdomen: normal bowel sounds, soft, no tenderness, no distention  : Uterine fundus firm: -2 cm below the umbilicus  Lower Extremities: Non-tender, peripheral pulses normal; no clubbing, cyanosis, or edema    Labs:   Lab Results   Component Value Date    WBC 5 89 04/08/2018    HGB 10 1 (L) 04/08/2018    HCT 31 0 (L) 04/08/2018    MCV 82 04/08/2018     (L) 04/08/2018       Medications:   Current Facility-Administered Medications   Medication Dose Route Frequency    acetaminophen (TYLENOL) tablet 650 mg  650 mg Oral Q6H PRN    benzocaine-menthol-lanolin-aloe (DERMOPLAST) 20-0 5 % topical spray   Topical 4x Daily PRN    calcium carbonate (TUMS) chewable tablet 1,000 mg  1,000 mg Oral Daily PRN    diphenhydrAMINE (BENADRYL) tablet 25 mg  25 mg Oral Q6H PRN    docusate sodium (COLACE) capsule 100 mg  100 mg Oral BID    hydrocortisone 1 % cream 1 application  1 application Topical 4x Daily PRN    ibuprofen (MOTRIN) tablet 600 mg  600 mg Oral Q6H PRN    lactated ringers infusion  125 mL/hr Intravenous Continuous    medroxyPROGESTERone (DEPO-PROVERA) IM injection 150 mg  150 mg Intramuscular Once    ondansetron (ZOFRAN) injection 4 mg  4 mg Intravenous Q8H PRN    oxyCODONE-acetaminophen (PERCOCET) 5-325 mg per tablet 1 tablet  1 tablet Oral Q4H PRN    oxyCODONE-acetaminophen (PERCOCET) 5-325 mg per tablet 2 tablet  2 tablet Oral Q4H PRN    oxytocin (PITOCIN) 30 Units in lactated ringers 500 mL infusion  1-30 naldo-units/min Intravenous Titrated    witch hazel-glycerin (TUCKS) topical pad 1 pad  1 pad Topical PRN     Invasive Devices     Peripheral Intravenous Line            Peripheral IV 04/08/18 Right Antecubital less than 1 day                     Lavonne Singh MD PGY-2   4/9/2018 6:24 AM

## 2018-04-09 NOTE — PLAN OF CARE

## 2018-04-09 NOTE — PLAN OF CARE
Problem: PAIN - ADULT  Goal: Verbalizes/displays adequate comfort level or baseline comfort level  Interventions:  - Encourage patient to monitor pain and request assistance  - Assess pain using appropriate pain scale  - Administer analgesics based on type and severity of pain and evaluate response  - Implement non-pharmacological measures as appropriate and evaluate response  - Consider cultural and social influences on pain and pain management  - Notify physician/advanced practitioner if interventions unsuccessful or patient reports new pain   Outcome: Progressing      Problem: INFECTION - ADULT  Goal: Absence or prevention of progression during hospitalization  INTERVENTIONS:  - Assess and monitor for signs and symptoms of infection  - Monitor lab/diagnostic results  - Monitor all insertion sites, i e  indwelling lines, tubes, and drains  - Monitor endotracheal (as able) and nasal secretions for changes in amount and color  - [place] appropriate cooling/warming therapies per order  - Administer medications as ordered  - Instruct and encourage patient and family to use good hand hygiene technique  - Identify and instruct in appropriate isolation precautions for identified infection/condition   Outcome: Progressing      Problem: SAFETY ADULT  Goal: Patient will remain free of falls  INTERVENTIONS:  - Assess patient frequently for physical needs  -  Identify cognitive and physical deficits and behaviors that affect risk of falls    -  [place] fall precautions as indicated by assessment   - Educate patient/family on patient safety including physical limitations  - Instruct patient to call for assistance with activity based on assessment  - Modify environment to reduce risk of injury  - Consider OT/PT consult to assist with strengthening/mobility   Outcome: Progressing      Problem: POSTPARTUM  Goal: Experiences normal postpartum course  INTERVENTIONS:  - Monitor maternal vital signs  - Assess uterine involution and lochia   Outcome: Progressing    Goal: Appropriate maternal -  bonding  INTERVENTIONS:  - Identify family support  - Assess for appropriate maternal/infant bonding   -Encourage maternal/infant bonding opportunities  - Referral to  or  as needed   Outcome: Progressing    Goal: Establishment of infant feeding pattern  INTERVENTIONS:  - Assess breast/bottle feeding  - Refer to lactation as needed   Outcome: Progressing    Goal: Incision(s), wounds(s) or drain site(s) healing without S/S of infection  INTERVENTIONS  - Assess and document risk factors for skin impairment   - Assess and document dressing, incision, wound bed, drain sites and surrounding tissue  - Initiate Nutrition services consult and/or wound management as needed   Outcome: Progressing

## 2018-04-09 NOTE — SOCIAL WORK
Consult received for poor prenatal care from [facility] team as well as neonatology  CM met with mom and dad, Nelda Banda, to do a general SW assessment  Mom gave birth to a baby boy, Elias Croft  Parents live together  Mom has twins at home, delivered here on 2015 - names Radhames Shafer and Elkin Valencia  She has a 9yo daughter who lives with her father  Notes reviewed from CM from 2015 delivery - mom does not have custody of 9yo, however, has not had open CYS case since 2014  CM called [facility] CYS today and this was confirmed  Mom reports having all necessary items for baby at home  She is formula feeding and has a [facility] appointment on Friday  Poor prenatal care addressed - she reports transportation issues as she now lives in [place], her car is not working  Reports family will be helping with transport  Notified family of [place] transport program provided through her [employer]  She will call to inquire about setting this up she she has secure transport to medical appointments  She plans to use  [facility] for ped needs  She is aware baby needs to be seen 1-2 days after hospital discharge  Identified FOBs mother as a supportive person for when she is discharged home  No mental health hx reported  No concerns for PPD  UDS reviewed for mom and baby, both negative  Moms history of UDS have been negative for any substances  No social concerns identified during assessment  Parents made aware of transportation resources available to them

## 2018-04-10 VITALS
SYSTOLIC BLOOD PRESSURE: 96 MMHG | TEMPERATURE: 98.6 F | BODY MASS INDEX: 24.16 KG/M2 | RESPIRATION RATE: 17 BRPM | WEIGHT: 145 LBS | OXYGEN SATURATION: 100 % | HEART RATE: 94 BPM | DIASTOLIC BLOOD PRESSURE: 65 MMHG | HEIGHT: 65 IN

## 2018-04-10 PROCEDURE — 99024 POSTOP FOLLOW-UP VISIT: CPT | Performed by: OBSTETRICS & GYNECOLOGY

## 2018-04-10 RX ORDER — DIAPER,BRIEF,INFANT-TODD,DISP
1 EACH MISCELLANEOUS 4 TIMES DAILY PRN
Qty: 30 G | Refills: 0
Start: 2018-04-10 | End: 2018-09-11

## 2018-04-10 RX ORDER — ACETAMINOPHEN 325 MG/1
325 TABLET ORAL EVERY 4 HOURS PRN
Qty: 30 TABLET | Refills: 0
Start: 2018-04-10 | End: 2018-09-11

## 2018-04-10 RX ORDER — IBUPROFEN 600 MG/1
600 TABLET ORAL EVERY 6 HOURS PRN
Qty: 30 TABLET | Refills: 0
Start: 2018-04-10 | End: 2018-09-11

## 2018-04-10 RX ORDER — DOCUSATE SODIUM 100 MG/1
100 CAPSULE, LIQUID FILLED ORAL 2 TIMES DAILY
Qty: 10 CAPSULE | Refills: 0
Start: 2018-04-10 | End: 2018-09-11

## 2018-04-10 RX ADMIN — DOCUSATE SODIUM 100 MG: 100 CAPSULE, LIQUID FILLED ORAL at 08:28

## 2018-04-10 RX ADMIN — ACETAMINOPHEN 650 MG: 325 TABLET, FILM COATED ORAL at 08:28

## 2018-04-10 RX ADMIN — OXYCODONE HYDROCHLORIDE AND ACETAMINOPHEN 2 TABLET: 5; 325 TABLET ORAL at 00:17

## 2018-04-10 RX ADMIN — OXYCODONE HYDROCHLORIDE AND ACETAMINOPHEN 1 TABLET: 5; 325 TABLET ORAL at 08:28

## 2018-04-10 NOTE — PROGRESS NOTES
Progress Note - OB/GYN   Parris Apa 34 y o  female MRN: [id number]  Unit/Bed#: L&D 306-01 Encounter: [id number]    Assessment:  PP #2 s/p Spontaneous Vaginal Delivery, stable    Plan:  1  Case Management consult complete, no case management needs addressed  2  Contraception: s/p Depo, for follow-up at postpartum  3  Continue routine postpartum care  4  Encourage ambulation  5  Encourage breastfeeding  6  Pain control as needed    Disposition: stable, anticipate discharge today    Subjective/Objective   Chief Complaint:     PP #2 s/p Spontaneous Vaginal Delivery    Subjective:     Pain: no  Tolerating PO: yes  Voiding: yes  Flatus: yes  BM: no  Ambulating: yes  Breastfeeding: Bottle feeding  Chest pain: no  Shortness of breath: no  Leg pain: no  Lochia: minimal    Objective:     Vitals:  Vitals:    04/09/18 0700 04/09/18 1100 04/09/18 1500 04/09/18 2300   BP: 90/50 115/72 98/57 94/67   BP Location: Right arm Left arm Left arm Left arm   Pulse: 80 91 82 87   Resp: 17 17 17 17   Temp: 98 °F (36 7 °C) 98 1 °F (36 7 °C) 98 3 °F (36 8 °C) 98 3 °F (36 8 °C)   TempSrc: Oral Oral Oral Oral   Weight:       Height:           Physical Exam:     Physical Exam   Constitutional: She is oriented to person, place, and time  She appears well-developed and well-nourished  Cardiovascular: Normal rate, regular rhythm and normal heart sounds  Pulmonary/Chest: Effort normal and breath sounds normal    Abdominal: Soft  Bowel sounds are normal    Neurological: She is alert and oriented to person, place, and time  Uterine fundus firm and non-tender, -1 cm below the umbilicus       Lab, Imaging and other studies: I have personally reviewed pertinent reports        Lab Results   Component Value Date    WBC 5 89 04/08/2018    HGB 10 1 (L) 04/08/2018    HCT 31 0 (L) 04/08/2018    MCV 82 04/08/2018     (L) 04/08/2018               Marnie Stapleton DO  04/10/18

## 2018-04-10 NOTE — PLAN OF CARE
Problem: PAIN - ADULT  Goal: Verbalizes/displays adequate comfort level or baseline comfort level  Interventions:  - Encourage patient to monitor pain and request assistance  - Assess pain using appropriate pain scale  - Administer analgesics based on type and severity of pain and evaluate response  - Implement non-pharmacological measures as appropriate and evaluate response  - Consider cultural and social influences on pain and pain management  - Notify physician/advanced practitioner if interventions unsuccessful or patient reports new pain   Outcome: Progressing      Problem: SAFETY ADULT  Goal: Patient will remain free of falls  INTERVENTIONS:  - Assess patient frequently for physical needs  -  Identify cognitive and physical deficits and behaviors that affect risk of falls    -  Brandon fall precautions as indicated by assessment   - Educate patient/family on patient safety including physical limitations  - Instruct patient to call for assistance with activity based on assessment  - Modify environment to reduce risk of injury  - Consider OT/PT consult to assist with strengthening/mobility   Outcome: Progressing      Problem: POSTPARTUM  Goal: Experiences normal postpartum course  INTERVENTIONS:  - Monitor maternal vital signs  - Assess uterine involution and lochia   Outcome: Progressing    Goal: Appropriate maternal -  bonding  INTERVENTIONS:  - Identify family support  - Assess for appropriate maternal/infant bonding   -Encourage maternal/infant bonding opportunities  - Referral to  or  as needed   Outcome: Progressing    Goal: Establishment of infant feeding pattern  INTERVENTIONS:  - Assess breast/bottle feeding  - Refer to lactation as needed   Outcome: Progressing    Goal: Incision(s), wounds(s) or drain site(s) healing without S/S of infection  INTERVENTIONS  - Assess and document risk factors for skin impairment   - Assess and document dressing, incision, wound bed, drain sites and surrounding tissue  - Initiate Nutrition services consult and/or wound management as needed   Outcome: Progressing

## 2018-04-10 NOTE — NURSING NOTE
Dr Cooper Caceres  Notified about pts request for percocet and tylenol  Pt was educated and encouraged to try to take motrin and tylenol at the same time she needs pain medication  Pt stated understanding

## 2018-04-11 ENCOUNTER — TRANSITIONAL CARE MANAGEMENT (OUTPATIENT)
Dept: FAMILY MEDICINE CLINIC | Facility: CLINIC | Age: 28
End: 2018-04-11

## 2018-05-08 ENCOUNTER — OFFICE VISIT (OUTPATIENT)
Dept: OBGYN CLINIC | Facility: CLINIC | Age: 28
End: 2018-05-08
Payer: COMMERCIAL

## 2018-05-08 VITALS
WEIGHT: 130 LBS | SYSTOLIC BLOOD PRESSURE: 99 MMHG | DIASTOLIC BLOOD PRESSURE: 64 MMHG | BODY MASS INDEX: 21.66 KG/M2 | HEIGHT: 65 IN

## 2018-05-08 DIAGNOSIS — Z30.42 ENCOUNTER FOR SURVEILLANCE OF INJECTABLE CONTRACEPTIVE: ICD-10-CM

## 2018-05-08 PROBLEM — Z87.51 HISTORY OF PRETERM DELIVERY: Status: RESOLVED | Noted: 2018-01-31 | Resolved: 2018-05-08

## 2018-05-08 PROBLEM — B95.1 GROUP BETA STREP POSITIVE: Status: RESOLVED | Noted: 2018-03-21 | Resolved: 2018-05-08

## 2018-05-08 PROBLEM — O99.013 ANEMIA DURING PREGNANCY IN THIRD TRIMESTER: Status: RESOLVED | Noted: 2018-03-21 | Resolved: 2018-05-08

## 2018-05-08 PROBLEM — B37.3 VULVOVAGINAL CANDIDIASIS: Status: RESOLVED | Noted: 2018-01-31 | Resolved: 2018-05-08

## 2018-05-08 PROBLEM — O26.879 SHORT CERVIX AFFECTING PREGNANCY: Status: RESOLVED | Noted: 2018-01-31 | Resolved: 2018-05-08

## 2018-05-08 RX ORDER — MEDROXYPROGESTERONE ACETATE 150 MG/ML
150 INJECTION, SUSPENSION INTRAMUSCULAR
Qty: 1 ML | Refills: 5 | Status: SHIPPED | OUTPATIENT
Start: 2018-05-08 | End: 2018-09-11

## 2018-05-08 NOTE — LETTER
May 8, 2018     Patient: Di Phillips   YOB: 1990   Date of Visit: 5/8/2018       To Whom it May Concern:    Ruby Osman is under my professional care  She was seen in my office on 5/8/2018  She may return to work on 5/21/2018  If you have any questions or concerns, please don't hesitate to call           Sincerely,          AARON Gutierrez        CC: No Recipients

## 2018-05-08 NOTE — PATIENT INSTRUCTIONS
Annual exam due after 9/13/2018  Call pharmacy to  next [address]  Return for injection with medication  Call withn needs or concerns

## 2018-05-08 NOTE — PROGRESS NOTES
OB POSTPARTUM VISIT PROGRESS NOTE  Date of Encounter: 2018    Shaq Ann Marie    : 1990  (34 y o )  MR: [id number]    Subjective   Carey Boyle is in for her postpartum visit  She is now [id number]  She delivered by normal spontaneous vaginal delivery  She's generally doing well, denies current pain or bleeding issues, and has no significant depression issues  She is assistedassisted by the FOB and his family  She is bottle feeding  We discussed all appropriate contraceptive options and she chooses Depoprovera  Denies pain, denies problems with urinating or BM's  Pt had WNL PAP 2017        Objective   EXAM:  GENERAL: alert, well appearing, and in no distress  VITALS: Blood pressure 99/64, height 5' 5" (1 651 m), weight 59 kg (130 lb), last menstrual period 07/10/2017, not currently breastfeeding  BMI: Body mass index is 21 63 kg/m²  BREASTS: Denies pain, redness or lumps, pt is bottlefeeding  EXTREMITIES: Denies pain redness or edema  Denies pain  Assessment/Plan   Diagnoses and all orders for this visit:    Postpartum follow-up     (spontaneous vaginal delivery)    Plan  Annual exam due after 2018  Call pharmacy to  next [address]  Return for injection with medication  Call withn needs or concerns  Pt verbalized understanding of all discussed        AARON Wilson

## 2018-08-01 NOTE — CASE MANAGEMENT
Notification of Maternity Inpatient Admission/Maternity Inpatient Authorization Request  This is a Notification of Maternity Inpatient Admission/Maternity Inpatient Authorization Request to our facility [address] [facility]  Please be advised that this patient is currently in our facility under Inpatient Status  Below you will find the Birth/[place] Summary, Attending Physician and Facilitys information including NPI# and contact for the Utilization  assigned to the [facility] where the patient is receiving services  Please feel free to contact the Utilization Review Department with any questions  Mothers Information:  Ari Shine  MRN: [id number]  YOB: 1990  Admission Date: 2018 11:35 AM  Discharge Date: 4/10/2018  2:26 PM  Disposition: Home/Self Care  Admitting Diagnosis: O80 VAGINAL DELIVERY   Information:  Estimated Date of Delivery: 18  Information for the patient's :  Ottoluz elena Lopez [[id number]]      Delivery Information:  Sex: male  Delivered 2018 11:49 AM by Vaginal, Spontaneous Delivery; Gestational Age: 43w3d     Measurements:  Weight: 7 lb 14 oz (3572 g); Height: 21 5"    APGAR 1 minute 5 minutes 10 minutes   Totals: 8 9      OB History as of 06/10/18      Para Term  AB Living    5 3 2 1 2 4    SAB TAB Ectopic Multiple Live Births      2   1 4        Attending Physician:  Idamae Schilder, M D  [facility] ID- [id number]  [address] [facility]   [facility] [address], [address] [facility]  Phone 1: [phone]  Phone 2:   Fax: [phone]    Facility: [address] [place] [address], [facility] [address] [facility]  [phone]  Tax ID: [id number]  NPI: [id number]    Thank you,  Leela Celis  291 Utilization Review Department  Phone: [phone]; Fax [phone]  ATTENTION: The Network Utilization Review Department is now centralized for our 9 Facilities  Make a note that we have a new phone and fax numbers for our Department  Please call with any questions or concerns to [phone] and carefully follow the prompts so that you are directed to the right person  All voicemails are confidential  Fax any determinations, approvals, denials, and requests for initial or continue stay review clinical to [phone]  Due to HIGH CALL volume, it would be easier if you could please send faxed requests to expedite your requests and in part, help us provide discharge notifications faster

## 2018-08-01 NOTE — CASE MANAGEMENT
Notification of Maternity Inpatient Admission/Maternity Inpatient Authorization Request  This is a Notification of Maternity Inpatient Admission/Maternity Inpatient Authorization Request to our facility [address] [facility]  Please be advised that this patient is currently in our facility under Inpatient Status  Below you will find the Birth/[place] Summary, Attending Physician and Facilitys information including NPI# and contact for the Utilization  assigned to the [facility] where the patient is receiving services  Please feel free to contact the Utilization Review Department with any questions  Mothers Information:  Phillip Ma  MRN: [id number]  YOB: 1990  Admission Date: 2018 11:35 AM  Discharge Date: 4/10/2018  2:26 PM  Disposition: Home/Self Care  Admitting Diagnosis: O80  [place] Information:  Estimated Date of Delivery: 18  Information for the patient's :  Hiren Roper [[id number]]     [place] Delivery Information:  Sex: male  Delivered 2018 11:49 AM by Vaginal, Spontaneous Delivery; Gestational Age: 43w3d    [place] Measurements:  Weight: 7 lb 14 oz (3572 g); Height: 21 5"    APGAR 1 minute 5 minutes 10 minutes   Totals: 8 9      OB History as of 06/10/18      Para Term  AB Living    5 3 2 1 2 4    SAB TAB Ectopic Multiple Live Births      2   1 4        Attending Physician:  LITA Brown  Specialty- Obstetrics and Gynecology  [address] [id number]  [address] [place] [address] [address] [facility]  Phone 1: [phone]  Fax: [phone]  Facility: [place] [address] [address] [facility]  [phone]  Tax ID: [id number]  NPI: [id number]    Notification of Discharge  This is a Notification of Discharge from our facility [address] [facility]  Please be advised that this patient has been discharge from our facility   Below you will find the admission and discharge date and time including the patients disposition  PRESENTATION DATE: 4/8/2018 11:35 AM  IP ADMISSION DATE: 4/8/18 1208  DISCHARGE DATE: 4/10/2018  2:26 PM  DISPOSITION: [address] [facility] in the [facility]  Phone: [phone]; Fax [phone]  ATTENTION: The Network Utilization Review Department is now centralized for our 9 Facilities  Make a note that we have a new phone and fax numbers for our Department  Please call with any questions or concerns to [phone] and carefully follow the prompts so that you are directed to the right person  All voicemails are confidential  Fax any determinations, approvals, denials, and requests for initial or continue stay review clinical to [phone]  Due to HIGH CALL volume, it would be easier if you could please send faxed requests to expedite your requests and in part, help us provide discharge notifications faster

## 2018-09-11 ENCOUNTER — HOSPITAL ENCOUNTER (EMERGENCY)
Facility: HOSPITAL | Age: 28
Discharge: HOME | End: 2018-09-11
Attending: EMERGENCY MEDICINE | Admitting: EMERGENCY MEDICINE
Payer: COMMERCIAL

## 2018-09-11 VITALS
RESPIRATION RATE: 18 BRPM | HEART RATE: 89 BPM | WEIGHT: 130.4 LBS | TEMPERATURE: 98 F | DIASTOLIC BLOOD PRESSURE: 79 MMHG | HEIGHT: 64 IN | OXYGEN SATURATION: 97 % | SYSTOLIC BLOOD PRESSURE: 128 MMHG | BODY MASS INDEX: 22.26 KG/M2

## 2018-09-11 DIAGNOSIS — K08.89 DENTALGIA: Primary | ICD-10-CM

## 2018-09-11 PROCEDURE — 99282 EMERGENCY DEPT VISIT SF MDM: CPT

## 2018-09-11 RX ORDER — LIDOCAINE HYDROCHLORIDE 20 MG/ML
JELLY TOPICAL ONCE
Status: COMPLETED | OUTPATIENT
Start: 2018-09-11 | End: 2018-09-11

## 2018-09-11 RX ORDER — NAPROXEN 250 MG/1
500 TABLET ORAL ONCE
Status: COMPLETED | OUTPATIENT
Start: 2018-09-11 | End: 2018-09-11

## 2018-09-11 RX ORDER — MORPHINE SULFATE 15 MG/1
15 TABLET ORAL EVERY 6 HOURS PRN
Qty: 13 TABLET | Refills: 0 | Status: SHIPPED | OUTPATIENT
Start: 2018-09-11 | End: 2020-02-27

## 2018-09-11 RX ORDER — LIDOCAINE HYDROCHLORIDE 40 MG/ML
5 SOLUTION TOPICAL ONCE
Status: DISCONTINUED | OUTPATIENT
Start: 2018-09-11 | End: 2018-09-11

## 2018-09-11 RX ORDER — ACETAMINOPHEN AND CODEINE PHOSPHATE 300; 30 MG/1; MG/1
1 TABLET ORAL EVERY 4 HOURS PRN
COMMUNITY
End: 2020-02-27

## 2018-09-11 RX ADMIN — LIDOCAINE HYDROCHLORIDE: 20 JELLY TOPICAL at 01:30

## 2018-09-11 RX ADMIN — NAPROXEN 500 MG: 250 TABLET ORAL at 01:29

## 2018-09-11 NOTE — DISCHARGE INSTRUCTIONS
Toothache   WHAT YOU NEED TO KNOW:   A toothache is pain that is caused by irritation of the nerves in the center of your tooth  The irritation may be caused by several problems, such as a cavity, an infection, a cracked tooth, or gum disease  It is very important to follow up with your dentist so the cause of your toothache can be diagnosed and treated  This can help prevent more serious problems  DISCHARGE INSTRUCTIONS:   Medicines: You may  need any of the following:  · NSAIDs  decrease swelling and pain  This medicine can be bought with or without a doctor's order  This medicine can cause stomach bleeding or kidney problems in certain people  If you take blood thinner medicine, always ask your healthcare provider if NSAIDs are safe for you  Always read the medicine label and follow the directions on it before using this medicine  · Acetaminophen  decreases pain  It is available without a doctor's order  Ask how much to take and how often to take it  Follow directions  Acetaminophen can cause liver damage if not taken correctly  · Pain medicine  may be given as a pill or as medicine that you put directly on your tooth or gums  Do not wait until the pain is severe before you take this medicine  · Antibiotics  help fight or prevent an infection caused by bacteria  Take them as directed  · Take your medicine as directed  Contact your healthcare provider if you think your medicine is not helping or if you have side effects  Tell him of her if you are allergic to any medicine  Keep a list of the medicines, vitamins, and herbs you take  Include the amounts, and when and why you take them  Bring the list or the pill bottles to follow-up visits  Carry your medicine list with you in case of an emergency  Follow up with your dentist as directed: You may be referred to a dental surgeon  Write down your questions so you remember to ask them during your visits     Self-care:   · Rinse your mouth with warm salt water 4 times a day or as directed  · You may need to eat soft foods to help relieve pain caused by chewing  Contact your dentist if:   · You have questions or concerns about your condition or care  Return to the emergency department if:   · You have trouble breathing  · You have swelling in your face or neck  · You have a fever and chills  · You have trouble speaking or swallowing  · You have trouble opening or closing your mouth  © 2017 [address] [facility] Information is for End User's use only and may not be sold, redistributed or otherwise used for commercial purposes  All illustrations and images included in CareNotes® are the copyrighted property of A D A M , Inc  or Tamir Vasquez  The above information is an  only  It is not intended as medical advice for individual conditions or treatments  Talk to your doctor, nurse or pharmacist before following any medical regimen to see if it is safe and effective for you  Diclofenac (By mouth)   Diclofenac (dye-KLOE-fen-ak)  Treats pain  Also treats migraines  This medicine is an NSAID  Brand Name(s): Cambia, Cataflam, DermaSilkRx Anodynexa Adelfo, DermaSilkRx [employer], DermacinRx Chepe, Cumeira, NuDiclo TabPAK, PrevidolRx Analgesic Adelfo, PrevidolRx Plus Analgesic Adelfo, Voltaren, Zipsor, Zorvolex   There may be other brand names for this medicine  When This Medicine Should Not Be Used: This medicine is not right for everyone  Do not use it if you had an allergic reaction to diclofenac, aspirin, or another NSAID  How to Use This Medicine:   Capsule, Liquid, Tablet, Coated Tablet, Long Acting Tablet  · Your doctor will tell you how much medicine to use  Do not use more than directed  · This medicine should come with a Medication Guide  Ask your pharmacist for a copy if you do not have one  · Oral solution: Mix the packet contents with 1 to 2 ounces (30 to 60 mL) of water   Do not use any liquid other than water for mixing the medicine  Mix well and drink it immediately on an empty stomach  · Missed dose: Take a dose as soon as you remember  If it is almost time for your next dose, wait until then and take a regular dose  Do not take extra medicine to make up for a missed dose  · Store the medicine in a closed container at room temperature, away from heat, moisture, and direct light     Drugs and Foods to Avoid:   Ask your doctor or pharmacist before using any other medicine, including over-the-counter medicines, vitamins, and herbal products  · Do not use any other NSAID unless your doctor says it is okay  Some other NSAIDs are aspirin, diflunisal, ibuprofen, naproxen, or salsalate  · Some medicines and foods can affect how diclofenac works  Tell your doctor if you are also using any of the following:  ¨ Cyclosporine, digoxin, lithium, methotrexate, pemetrexed  ¨ Blood pressure medicine  ¨ Blood thinner (such as warfarin)  ¨ Diuretic (water pill)  ¨ Medicine to treat depression  ¨ Steroid medicine  Warnings While Using This Medicine:   · Tell your doctor if you are pregnant or breastfeeding  Do not use this medicine during the later part of a pregnancy, unless your doctor tells you to  · Tell your doctor if you have kidney disease, liver disease, asthma, heart failure, high blood pressure, or heart or blood vessel problems, or a history of stomach ulcers or bleeding problems  Tell your doctor if you have phenylketonuria (PKU)  Also tell your doctor if you drink alcohol  · This medicine may cause the following problems:  ¨ Increased risk of heart attack, heart failure, or stroke  ¨ Bleeding in your stomach or intestines  ¨ Liver problems  ¨ Serious skin reactions  · Your headaches may become worse if you use a headache medicine for 10 or more days per month  Write down how often your headaches occur and how often you use this medicine    · Your doctor will do lab tests at regular visits to check on the effects of this medicine  Keep all appointments  · Keep all medicine out of the reach of children  Never share your medicine with anyone  Possible Side Effects While Using This Medicine:   Call your doctor right away if you notice any of these side effects:  · Allergic reaction: Itching or hives, swelling in your face or hands, swelling or tingling in your mouth or throat, chest tightness, trouble breathing  · Blistering, peeling, or red skin rash  · Bloody or black, tarry stools, severe stomach pain, vomiting blood or material that looks like coffee grounds  · Change in how much or how often you urinate  · Chest pain that may spread to your arms, jaw, back, or neck, trouble breathing, unusual sweating, faintness  · Dark urine or pale stools, nausea, vomiting, loss of appetite, stomach pain, yellow skin or eyes  · Numbness or weakness in your arm or leg, or on one side of your body, pain in your lower leg, sudden or severe headache, or problems with vision, speech, or walking  · Rapid weight gain, swelling in your hands, ankles, or feet  If you notice these less serious side effects, talk with your doctor:   · Constipation or diarrhea  · Mild headache  If you notice other side effects that you think are caused by this medicine, tell your doctor  Call your doctor for medical advice about side effects  You may report side effects to FDA at 4-362-FDA-9284  © 2017 [address] [employer] Information is for End User's use only and may not be sold, redistributed or otherwise used for commercial purposes  The above information is an  only  It is not intended as medical advice for individual conditions or treatments  Talk to your doctor, nurse or pharmacist before following any medical regimen to see if it is safe and effective for you  Morphine, Rapid Release (By mouth)   Morphine (MOR-feen)  Treats moderate to severe pain  This medicine is a narcotic pain reliever     Brand Name(s):   There may be other brand names for this medicine  When This Medicine Should Not Be Used: This medicine is not right for everyone  Do not use it if you had an allergic reaction to morphine, codeine, hydrocodone, dihydrocodeine, or oxycodone, or you have severe lung or breathing problems or paralytic ileus  How to Use This Medicine:   Capsule, Liquid, Tablet  · Take your medicine as directed  Your dose may need to be changed several times to find what works best for you  · An overdose can be dangerous  Follow directions carefully so you do not get too much medicine at one time  · It is best to take this medicine with food or milk  · Capsule: If you cannot swallow the capsule whole, you may open it and mix the medicine pellets with a small amount of applesauce, pudding, juice, or water  Swallow the mixture right away, without chewing or crushing the pellets  · Oral liquid: Measure the oral liquid medicine with a marked measuring spoon, oral syringe, or medicine cup  · This medicine should come with a Medication Guide  Ask your pharmacist for a copy if you do not have one  · Missed dose: If you miss a dose of this medicine, skip the missed dose and take your next dose at your usual time the next day  Do not double doses  · Store the medicine in a closed container at room temperature, away from heat, moisture, and direct light  Store the medicine in a safe and secure place  Do not throw unused medicine in the trash  Ask your pharmacist about the best way to dispose of medicine you do not use  Drugs and Foods to Avoid:   Ask your doctor or pharmacist before using any other medicine, including over-the-counter medicines, vitamins, and herbal products  · Do not use this medicine if you are using or have used an MAO inhibitor within the past 14 days  · Some medicines can affect how morphine works   Tell your doctor if you are using any of the following:  ¨ Cimetidine, mirtazapine, quinidine, tramadol, trazodone, or verapamil  ¨ Diuretic (water pill)  ¨ Medicine to treat depression  ¨ Phenothiazine medicine  ¨ Triptan medicine to treat migraine headaches  · Do not drink alcohol while you are using this medicine  · Tell your doctor if you use anything else that makes you sleepy  Some examples are allergy medicine, narcotic pain medicine, and alcohol  Tell your doctor if you are also using buprenorphine, butorphanol, nalbuphine, pentazocine, a benzodiazepine, or a muscle relaxer  Warnings While Using This Medicine:   · Tell your doctor if you are pregnant or breastfeeding, or if you have kidney disease, liver disease, heart disease, low blood pressure, breathing problems or lung disease (such as asthma or COPD), [place]'s disease, gallbladder problems, pancreas problems, thyroid problems, an enlarged prostate, trouble urinating, or stomach or bowel problems  Tell your doctor if you have a history of head injury, brain tumor, depression, seizures, or alcohol or drug abuse  · This medicine may cause the following problems:  ¨ High risk of overdose, which can lead to death  ¨ Respiratory depression (serious breathing problem that can be life-threatening)  ¨ Serotonin syndrome (when used with certain medicines)  · This medicine can be habit-forming  Do not use more than your prescribed dose  Call your doctor if you think your medicine is not working  · Do not stop using this medicine suddenly  Your doctor will need to slowly decrease your dose before you stop it completely  · This medicine could cause infertility  Talk with your doctor before using this medicine if you plan to have children  · This medicine may make you dizzy, drowsy, or lightheaded  Do not drive or do anything else that could be dangerous until you know how this medicine affects you  · This medicine may cause constipation, especially with long-term use  Ask your doctor if you should use a laxative to prevent and treat constipation    · Keep all medicine out of the reach of children  Never share your medicine with anyone  Possible Side Effects While Using This Medicine:   Call your doctor right away if you notice any of these side effects:  · Allergic reaction: Itching or hives, swelling in your face or hands, swelling or tingling in your mouth or throat, chest tightness, trouble breathing  · Anxiety, restlessness, fast heartbeat, fever, sweating, muscle spasms, twitching, nausea, vomiting, diarrhea, seeing or hearing things that are not there  · Blue lips, fingernails, or skin  · Extreme dizziness or weakness, shallow breathing, slow or uneven heartbeat, sweating, cold or clammy skin, seizures  · Severe confusion, lightheadedness, dizziness, or fainting  · Severe constipation, stomach pain, or vomiting  · Trouble breathing or slow breathing  If you notice these less serious side effects, talk with your doctor:   · Mild constipation, nausea, or vomiting  · Mild sleepiness or tiredness  If you notice other side effects that you think are caused by this medicine, tell your doctor  Call your doctor for medical advice about side effects  You may report side effects to FDA at 9-996-FDA-2416  © 2017 [address] [employer] Information is for End User's use only and may not be sold, redistributed or otherwise used for commercial purposes  The above information is an  only  It is not intended as medical advice for individual conditions or treatments  Talk to your doctor, nurse or pharmacist before following any medical regimen to see if it is safe and effective for you

## 2018-09-11 NOTE — ED PROVIDER NOTES
History  Chief Complaint   Patient presents with    Dental Pain     Pt reports she is to have her wisdom tooth removed  Appt is on the 9/17/18  Pt reports pain in lower left wisdom causing increased pain  History provided by:  Patient  Dental Pain   Location:  Lower  Lower teeth location:  17/LL 3rd molar  Quality:  Aching  Severity:  Moderate  Onset quality:  Gradual  Duration:  3 days  Timing:  Constant  Progression:  Worsening  Chronicity:  Recurrent  Context: poor dentition (Affected tooth has preexisting carious disease and is somewhat impacted  Has been intermittently painful previously but particularly increased since recent filling performed last week on another tooth in L lower jaw) and recent dental surgery    Context: not trauma    Relieved by: See below  Worsened by:  Jaw movement, pressure and touching  Ineffective treatments:  NSAIDs and acetaminophen (Has taken acetaminophen and ibuprofen without improvement in pain  Took apap+codeine left over from previous Rx with transient relief of pain)  Associated symptoms: no difficulty swallowing, no fever, no gum swelling, no neck swelling, no oral bleeding, no oral lesions and no trismus    Risk factors: sufficient dental care (Has appointment with dentist for extraction of affected tooth on 17 September 2018 ), no periodontal disease and no smoking        Prior to Admission Medications   Prescriptions Last Dose Informant Patient Reported?  Taking?   acetaminophen-codeine (TYLENOL #3) 300-30 mg per tablet 9/10/2018 at 2130  Yes Yes   Sig: Take 1 tablet by mouth every 4 (four) hours as needed for moderate pain      Facility-Administered Medications: None       Past Medical History:   Diagnosis Date    Anemia in pregnancy     History of chlamydia infection     Hx of gonorrhea        Past Surgical History:   Procedure Laterality Date    DILATION AND EVACUATION      two procedures     OTHER SURGICAL HISTORY      IUD removal        Family History Problem Relation Age of Onset    No Known Problems Mother     No Known Problems Father      I have reviewed and agree with the history as documented  Social History   Substance Use Topics    Smoking status: Never Smoker    Smokeless tobacco: Never Used    Alcohol use No        Review of Systems   Constitutional: Negative for chills, diaphoresis, fatigue and fever  HENT: Positive for dental problem  Negative for ear discharge, ear pain, mouth sores, trouble swallowing and voice change  Skin: Negative for color change, pallor, rash and wound  Hematological: Negative for adenopathy  Does not bruise/bleed easily  Physical Exam  Physical Exam   Constitutional: She is oriented to person, place, and time  Vital signs are normal  She appears well-developed and well-nourished  She is active and cooperative  She appears distressed (mild painful distress)  HENT:   Head: Normocephalic and atraumatic  Right Ear: Hearing and external ear normal    Left Ear: Hearing and external ear normal    Nose: Nose normal    Mouth/Throat: Uvula is midline, oropharynx is clear and moist and mucous membranes are normal  Abnormal dentition (Impaction with gingival mucosal overgrowth of medial surface of tooth #17  There is no obvious gingival swelling, abscess, or discharge present  Moderate carious decay of occlusal surface of tooth #17 )  Tonsils are 2+ on the right  Tonsils are 2+ on the left  No tonsillar exudate  Neck: Trachea normal and phonation normal    Cardiovascular: Normal rate, regular rhythm, intact distal pulses and normal pulses  Pulses:       Radial pulses are 2+ on the right side, and 2+ on the left side  Pulmonary/Chest: Effort normal  No respiratory distress  Lymphadenopathy:        Head (right side): No submental, no submandibular, no tonsillar, no preauricular and no posterior auricular adenopathy present          Head (left side): No submental, no submandibular, no tonsillar, no preauricular and no posterior auricular adenopathy present  She has no cervical adenopathy  Neurological: She is alert and oriented to person, place, and time  GCS eye subscore is 4  GCS verbal subscore is 5  GCS motor subscore is 6  Skin: Skin is warm, dry and intact  She is not diaphoretic  Nursing note and vitals reviewed  Vital Signs  ED Triage Vitals [09/11/18 0035]   Temperature Pulse Respirations Blood Pressure SpO2   98 °F (36 7 °C) 89 18 128/79 97 %      Temp Source Heart Rate Source Patient Position - Orthostatic VS BP Location FiO2 (%)   Temporal Monitor Sitting Right arm --      Pain Score       8           Vitals:    09/11/18 0035   BP: 128/79   Pulse: 89   Patient Position - Orthostatic VS: Sitting       Visual Acuity      ED Medications  Medications   lidocaine (XYLOCAINE) 4 % topical solution 5 mL (not administered)   naproxen (NAPROSYN) tablet 500 mg (not administered)       Diagnostic Studies  Results Reviewed     None                 No orders to display              Procedures  Procedures       Phone Contacts  ED Phone Contact    ED Course         MDM  Number of Diagnoses or Management Options  Dentalgia of tooth #17: established and worsening     Amount and/or Complexity of Data Reviewed  Decide to obtain previous medical records or to obtain history from someone other than the patient: yes  Review and summarize past medical records: yes    Risk of Complications, Morbidity, and/or Mortality  Presenting problems: moderate  Diagnostic procedures: low  Management options: low  General comments: There is gingival overgrowth of the affected tooth with some degree of dental carious disease without evidence of abscess or acute dental trauma  Patient is not systemically ill from the symptoms and does not have any signs/symptoms of deep space infection of the mandible  Does have appointment with dentist as previously noted on 17 September 2018 for removal of the tooth   I strongly encouraged her to maintain this appointment  Will Rx high potency NSAID for symptom control with immediate release morphine for breakthrough pain control on p r n  basis  Discussed need for baseline pain control with NSAID and morphine for p r n  breakthrough only  Patient is not breast-feeding  [place] PDMP queried prior to Rx  Based on review of records, there is no evidence of controlled substance abuse or repeated inappropriate ED visits to obtain controlled substances  It is therefore reasonable to prescribe a short course of opiate-based analgesic for symptom control with close f/u to PMD also advised  Precautions given to avoid use of opiate medications while driving and to abstain from alcohol while using  Patient Progress  Patient progress: stable    CritCare Time    Disposition  Final diagnoses:   Tyler Sours of tooth #17     Time reflects when diagnosis was documented in both MDM as applicable and the Disposition within this note     Time User Action Codes Description Comment    9/11/2018 12:46 AM Cassidy Smith Add [K08 89] Dentalgia     9/11/2018 12:47 AM Cassidy Smith Modify [K08 89] Dentalgia of tooth #17       ED Disposition     ED Disposition Condition Comment    Discharge  Sammi Brocket discharge to home/self care      Condition at discharge: Stable        Follow-up Information     Follow up With Specialties Details Why Contact Info    Your dentist  Go in 1 week As scheduled for your dental surgery           Patient's Medications   Discharge Prescriptions    DICLOFENAC SODIUM (VOLTAREN) 50 MG EC TABLET    Take 1 tablet (50 mg total) by mouth 2 (two) times a day as needed (dental pain)       Start Date: 9/11/2018 End Date: --       Order Dose: 50 mg       Quantity: 20 tablet    Refills: 0    MORPHINE (MSIR) 15 MG TABLET    Take 1 tablet (15 mg total) by mouth every 6 (six) hours as needed for severe pain (Do not drive or drink alcohol while using ) Max Daily Amount: 60 mg Start Date: 9/11/2018 End Date: --       Order Dose: 15 mg       Quantity: 13 tablet    Refills: 0     No discharge procedures on file      ED Provider  Electronically Signed by           Radames Alvares DO  09/11/18 0207

## 2020-02-27 ENCOUNTER — HOSPITAL ENCOUNTER (EMERGENCY)
Facility: HOSPITAL | Age: 30
Discharge: HOME | End: 2020-02-28
Attending: EMERGENCY MEDICINE | Admitting: EMERGENCY MEDICINE
Payer: COMMERCIAL

## 2020-02-27 DIAGNOSIS — N39.0 UTI (URINARY TRACT INFECTION): ICD-10-CM

## 2020-02-27 DIAGNOSIS — O03.9 SPONTANEOUS ABORTION IN FIRST TRIMESTER: Primary | ICD-10-CM

## 2020-02-27 DIAGNOSIS — O20.9 VAGINAL BLEEDING AFFECTING EARLY PREGNANCY: ICD-10-CM

## 2020-02-27 LAB
ANION GAP SERPL CALCULATED.3IONS-SCNC: 11 MMOL/L (ref 4–13)
B-HCG SERPL-ACNC: ABNORMAL MIU/ML
BACTERIA UR QL AUTO: ABNORMAL /HPF
BASOPHILS # BLD AUTO: 0.04 THOUSANDS/ΜL (ref 0–0.1)
BASOPHILS NFR BLD AUTO: 1 % (ref 0–1)
BILIRUB UR QL STRIP: ABNORMAL
BUN SERPL-MCNC: 13 MG/DL (ref 5–25)
CALCIUM SERPL-MCNC: 8.8 MG/DL (ref 8.3–10.1)
CHLORIDE SERPL-SCNC: 103 MMOL/L (ref 100–108)
CLARITY UR: CLEAR
CO2 SERPL-SCNC: 26 MMOL/L (ref 21–32)
COLOR UR: ABNORMAL
CREAT SERPL-MCNC: 0.81 MG/DL (ref 0.6–1.3)
EOSINOPHIL # BLD AUTO: 0.08 THOUSAND/ΜL (ref 0–0.61)
EOSINOPHIL NFR BLD AUTO: 1 % (ref 0–6)
ERYTHROCYTE [DISTWIDTH] IN BLOOD BY AUTOMATED COUNT: 16.5 % (ref 11.6–15.1)
EXT PREG TEST URINE: POSITIVE
EXT. CONTROL ED NAV: ABNORMAL
GFR SERPL CREATININE-BSD FRML MDRD: 115 ML/MIN/1.73SQ M
GLUCOSE SERPL-MCNC: 106 MG/DL (ref 65–140)
GLUCOSE UR STRIP-MCNC: ABNORMAL MG/DL
HCT VFR BLD AUTO: 32.3 % (ref 34.8–46.1)
HGB BLD-MCNC: 9.9 G/DL (ref 11.5–15.4)
HGB UR QL STRIP.AUTO: ABNORMAL
IMM GRANULOCYTES # BLD AUTO: 0.02 THOUSAND/UL (ref 0–0.2)
IMM GRANULOCYTES NFR BLD AUTO: 0 % (ref 0–2)
KETONES UR STRIP-MCNC: ABNORMAL MG/DL
LEUKOCYTE ESTERASE UR QL STRIP: ABNORMAL
LYMPHOCYTES # BLD AUTO: 1.35 THOUSANDS/ΜL (ref 0.6–4.47)
LYMPHOCYTES NFR BLD AUTO: 19 % (ref 14–44)
MCH RBC QN AUTO: 24.8 PG (ref 26.8–34.3)
MCHC RBC AUTO-ENTMCNC: 30.7 G/DL (ref 31.4–37.4)
MCV RBC AUTO: 81 FL (ref 82–98)
MONOCYTES # BLD AUTO: 0.5 THOUSAND/ΜL (ref 0.17–1.22)
MONOCYTES NFR BLD AUTO: 7 % (ref 4–12)
NEUTROPHILS # BLD AUTO: 5.08 THOUSANDS/ΜL (ref 1.85–7.62)
NEUTS SEG NFR BLD AUTO: 72 % (ref 43–75)
NITRITE UR QL STRIP: POSITIVE
NON-SQ EPI CELLS URNS QL MICRO: ABNORMAL /HPF
NRBC BLD AUTO-RTO: 0 /100 WBCS
PH UR STRIP.AUTO: 8.5 [PH] (ref 4.5–8)
PLATELET # BLD AUTO: 124 THOUSANDS/UL (ref 149–390)
PMV BLD AUTO: 11.7 FL (ref 8.9–12.7)
POTASSIUM SERPL-SCNC: 3.6 MMOL/L (ref 3.5–5.3)
PROT UR STRIP-MCNC: >=300 MG/DL
RBC # BLD AUTO: 4 MILLION/UL (ref 3.81–5.12)
RBC #/AREA URNS AUTO: ABNORMAL /HPF
SODIUM SERPL-SCNC: 140 MMOL/L (ref 136–145)
SP GR UR STRIP.AUTO: 1.01 (ref 1–1.03)
UROBILINOGEN UR QL STRIP.AUTO: >=8 E.U./DL
WBC # BLD AUTO: 7.07 THOUSAND/UL (ref 4.31–10.16)
WBC #/AREA URNS AUTO: ABNORMAL /HPF

## 2020-02-27 PROCEDURE — 85025 COMPLETE CBC W/AUTO DIFF WBC: CPT | Performed by: EMERGENCY MEDICINE

## 2020-02-27 PROCEDURE — 81001 URINALYSIS AUTO W/SCOPE: CPT

## 2020-02-27 PROCEDURE — 86901 BLOOD TYPING SEROLOGIC RH(D): CPT | Performed by: EMERGENCY MEDICINE

## 2020-02-27 PROCEDURE — 86850 RBC ANTIBODY SCREEN: CPT | Performed by: EMERGENCY MEDICINE

## 2020-02-27 PROCEDURE — 80048 BASIC METABOLIC PNL TOTAL CA: CPT | Performed by: EMERGENCY MEDICINE

## 2020-02-27 PROCEDURE — 99285 EMERGENCY DEPT VISIT HI MDM: CPT | Performed by: EMERGENCY MEDICINE

## 2020-02-27 PROCEDURE — 81025 URINE PREGNANCY TEST: CPT | Performed by: EMERGENCY MEDICINE

## 2020-02-27 PROCEDURE — 87086 URINE CULTURE/COLONY COUNT: CPT

## 2020-02-27 PROCEDURE — 36415 COLL VENOUS BLD VENIPUNCTURE: CPT | Performed by: EMERGENCY MEDICINE

## 2020-02-27 PROCEDURE — 84702 CHORIONIC GONADOTROPIN TEST: CPT | Performed by: EMERGENCY MEDICINE

## 2020-02-27 PROCEDURE — 86900 BLOOD TYPING SEROLOGIC ABO: CPT | Performed by: EMERGENCY MEDICINE

## 2020-02-27 PROCEDURE — 99284 EMERGENCY DEPT VISIT MOD MDM: CPT

## 2020-02-27 RX ORDER — ACETAMINOPHEN 325 MG/1
650 TABLET ORAL ONCE
Status: COMPLETED | OUTPATIENT
Start: 2020-02-27 | End: 2020-02-27

## 2020-02-27 RX ADMIN — ACETAMINOPHEN 650 MG: 325 TABLET ORAL at 22:43

## 2020-02-28 ENCOUNTER — APPOINTMENT (EMERGENCY)
Dept: ULTRASOUND IMAGING | Facility: HOSPITAL | Age: 30
End: 2020-02-28
Payer: COMMERCIAL

## 2020-02-28 VITALS
WEIGHT: 126.98 LBS | TEMPERATURE: 98.8 F | SYSTOLIC BLOOD PRESSURE: 114 MMHG | HEART RATE: 94 BPM | RESPIRATION RATE: 18 BRPM | OXYGEN SATURATION: 100 % | DIASTOLIC BLOOD PRESSURE: 70 MMHG | BODY MASS INDEX: 21.8 KG/M2

## 2020-02-28 LAB
ABO GROUP BLD: NORMAL
BLD GP AB SCN SERPL QL: NEGATIVE
RH BLD: POSITIVE
SPECIMEN EXPIRATION DATE: NORMAL

## 2020-02-28 PROCEDURE — 76815 OB US LIMITED FETUS(S): CPT

## 2020-02-28 RX ORDER — CEPHALEXIN 500 MG/1
500 CAPSULE ORAL EVERY 12 HOURS SCHEDULED
Qty: 14 CAPSULE | Refills: 0 | Status: SHIPPED | OUTPATIENT
Start: 2020-02-28 | End: 2020-03-06

## 2020-02-28 RX ORDER — NAPROXEN 500 MG/1
500 TABLET ORAL 2 TIMES DAILY WITH MEALS
Qty: 14 TABLET | Refills: 0 | Status: SHIPPED | OUTPATIENT
Start: 2020-02-28

## 2020-02-28 NOTE — ED NOTES
Patient transported to [address] [facility], [address] [place] [address]  02/28/20 8849

## 2020-02-28 NOTE — ED ATTENDING ATTESTATION
2/27/2020  I, Yadi Vera MD, saw and evaluated the patient  I have discussed the patient with the resident/non-physician practitioner and agree with the resident's/non-physician practitioner's findings, Plan of Care, and MDM as documented in the resident's/non-physician practitioner's note, except where noted  All available labs and Radiology studies were reviewed  I was present for key portions of any procedure(s) performed by the resident/non-physician practitioner and I was immediately available to provide assistance  At this point I agree with the current assessment done in the Emergency Department  I have conducted an independent evaluation of this patient a history and physical is as follows:      35 y/o F LNMP sometime in December presents for evaluation of 2 days of lower pelvic cramping and several hours of vaginal bleeding with clots  No n/v, f/c, back/flank pain  10 systems reviewed and othewrise neg  On exam nad, lungs nml, cardiac nml, abd nml   MDM:  Vaginal bleeding in pregnancy w/o confirmed iup with a benign abd exam-will do labs, u/s to r/o ectopic    ED Course  ED Course as of Feb 27 2314   Thu Feb 27, 2020   2244 Nitrite, UA(!): Positive         Critical Care Time  Procedures

## 2020-02-28 NOTE — ED PROVIDER NOTES
History  Chief Complaint   Patient presents with    Vaginal Bleeding - Pregnant     Pt took pregnancy test last month, was unsure of keeping baby so no OBGYN visits  Pt started cramping last night and passing blood clots, bleeding increased significantly  Pt denies any other symptoms  Pt has been using tissues since she has no pads  HPI   This is a 71-year-old woman, [id number], who presents to the emergency department for vaginal bleeding  Patient says that she had a positive home pregnancy test in the middle of January  Last menstrual period was mid December  Patient has not yet established care with an Ob or had an ultrasound done because this is not a desired pregnancy  Last night patient started having pelvic cramping and vaginal bleeding  Bleeding continued into today  She is unable to accurately quantify amount of bleeding because she doesn't have pads/tampons available and has been wiping herself frequently with tissues  She does not feel dizzy or lightheaded  Denies any upper abdominal pain, nausea, or vomiting  Denies dysuria or flank pain  She believes she is blood type O-positive  The patient has no history of ectopic pregnancy or tubal surgery  No history of pelvic inflammatory disease  She is a non-smoker  No use of assisted reproductive techniques that would increase risk of heterotopic pregnancy  None       Past Medical History:   Diagnosis Date    Anemia in pregnancy     History of chlamydia infection     Hx of gonorrhea        Past Surgical History:   Procedure Laterality Date    DILATION AND EVACUATION      two procedures     OTHER SURGICAL HISTORY      IUD removal        Family History   Problem Relation Age of Onset    No Known Problems Mother     No Known Problems Father      I have reviewed and agree with the history as documented      E-Cigarette/Vaping    E-Cigarette Use Never User      E-Cigarette/Vaping Substances     Social History     Tobacco Use    Smoking status: Never Smoker    Smokeless tobacco: Never Used   Substance Use Topics    Alcohol use: No    Drug use: No        Review of Systems   Constitutional: Negative for chills and fever  Respiratory: Negative for cough and shortness of breath  Cardiovascular: Negative for chest pain  Gastrointestinal: Negative for abdominal pain, nausea and vomiting  Genitourinary: Positive for pelvic pain and vaginal bleeding  Negative for dysuria, frequency and vaginal discharge  Skin: Negative for color change and pallor  All other systems reviewed and are negative  Physical Exam  ED Triage Vitals   Temperature Pulse Respirations Blood Pressure SpO2   02/27/20 2210 02/27/20 2210 02/27/20 2210 02/27/20 2210 02/27/20 2210   98 8 °F (37 1 °C) (!) 107 18 131/76 98 %      Temp Source Heart Rate Source Patient Position - Orthostatic VS BP Location FiO2 (%)   02/27/20 2210 02/27/20 2210 02/27/20 2210 02/27/20 2210 --   Oral Monitor Lying Right arm       Pain Score       02/27/20 2243       Worst Possible Pain             Orthostatic Vital Signs  Vitals:    02/27/20 2210 02/27/20 2332 02/28/20 0113   BP: 131/76 109/67 114/70   Pulse: (!) 107 90 94   Patient Position - Orthostatic VS: Lying Lying Lying       Physical Exam   Constitutional: She is oriented to person, place, and time  She appears well-developed and well-nourished  No distress  HENT:   Head: Normocephalic and atraumatic  Eyes: Pupils are equal, round, and reactive to light  Conjunctivae and EOM are normal  No scleral icterus  Neck: Normal range of motion  Neck supple  Cardiovascular: Normal rate and regular rhythm  Exam reveals no gallop and no friction rub  No murmur heard  Pulmonary/Chest: Breath sounds normal  She has no wheezes  She has no rales  Abdominal: Soft  She exhibits no distension  There is no tenderness  There is no rebound and no guarding  Musculoskeletal: Normal range of motion  She exhibits no edema or tenderness  Neurological: She is alert and oriented to person, place, and time  No cranial nerve deficit or sensory deficit  She exhibits normal muscle tone  Skin: Skin is warm and dry  She is not diaphoretic  No erythema  No pallor  Psychiatric: She has a normal mood and affect  Her behavior is normal    Nursing note and vitals reviewed        ED Medications  Medications   acetaminophen (TYLENOL) tablet 650 mg (650 mg Oral Given 2/27/20 2243)       Diagnostic Studies  Results Reviewed     Procedure Component Value Units Date/Time    Urine Microscopic [[id number]]  (Abnormal) Collected:  02/27/20 2234    Lab Status:  Final result Specimen:  Urine, Clean Catch Updated:  02/27/20 2357     RBC, UA Innumerable /hpf      WBC, UA       Field obscured, unable to enumerate     /hpf     Epithelial Cells       Field obscured, unable to enumerate     /hpf     Bacteria, UA       Field obscured, unable to enumerate     /hpf    Basic metabolic panel [[id number]] Collected:  02/27/20 2244    Lab Status:  Final result Specimen:  Blood from Arm, Left Updated:  02/27/20 2357     Sodium 140 mmol/L      Potassium 3 6 mmol/L      Chloride 103 mmol/L      CO2 26 mmol/L      ANION GAP 11 mmol/L      BUN 13 mg/dL      Creatinine 0 81 mg/dL      Glucose 106 mg/dL      Calcium 8 8 mg/dL      eGFR 115 ml/min/1 73sq m     Narrative:       [place] guidelines for Chronic Kidney Disease (CKD):     Stage 1 with normal or high GFR (GFR > 90 mL/min/1 73 square meters)    Stage 2 Mild CKD (GFR = 60-89 mL/min/1 73 square meters)    Stage 3A Moderate CKD (GFR = 45-59 mL/min/1 73 square meters)    Stage 3B Moderate CKD (GFR = 30-44 mL/min/1 73 square meters)    Stage 4 Severe CKD (GFR = 15-29 mL/min/1 73 square meters)    Stage 5 End Stage CKD (GFR <15 mL/min/1 73 square meters)  Note: GFR calculation is accurate only with a steady state creatinine    hCG, quantitative [[id number]]  (Abnormal) Collected:  02/27/20 2244    Lab Status:  Final result Specimen:  Blood from Arm, Left Updated:  02/27/20 2349     HCG, Quant 11,337 0 mIU/mL     Narrative:        Expected Ranges:     Approximate               Approximate HCG  Gestation age          Concentration ( mIU/mL)  _____________          ______________________   Jose Dumas                      HCG values  0 2-1                       5-50  1-2                           2-3                         100-5000  3-4                         500-00651  4-5                         1000-56133  5-6                         [id number]  6-8                         96393-103449  8-12                        43190-421953      CBC and differential [[id number]]  (Abnormal) Collected:  02/27/20 2244    Lab Status:  Final result Specimen:  Blood from Arm, Left Updated:  02/27/20 2317     WBC 7 07 Thousand/uL      RBC 4 00 Million/uL      Hemoglobin 9 9 g/dL      Hematocrit 32 3 %      MCV 81 fL      MCH 24 8 pg      MCHC 30 7 g/dL      RDW 16 5 %      MPV 11 7 fL      Platelets 755 Thousands/uL      nRBC 0 /100 WBCs      Neutrophils Relative 72 %      Immat GRANS % 0 %      Lymphocytes Relative 19 %      Monocytes Relative 7 %      Eosinophils Relative 1 %      Basophils Relative 1 %      Neutrophils Absolute 5 08 Thousands/µL      Immature Grans Absolute 0 02 Thousand/uL      Lymphocytes Absolute 1 35 Thousands/µL      Monocytes Absolute 0 50 Thousand/µL      Eosinophils Absolute 0 08 Thousand/µL      Basophils Absolute 0 04 Thousands/µL     Urine culture [[id number]] Collected:  02/27/20 2234    Lab Status:   In process Specimen:  Urine, Clean Catch Updated:  02/27/20 2257    POCT pregnancy, urine [[id number]]  (Abnormal) Resulted:  02/27/20 2244    Lab Status:  Final result Updated:  02/27/20 2245     EXT PREG TEST UR (Ref: Negative) Positive     Control valid    POCT urinalysis dipstick [[id number]]  (Abnormal) Resulted:  02/27/20 2244    Lab Status:  Final result Updated:  02/27/20 2244    Urine Macroscopic, POC [[id number]]  (Abnormal) Collected:  20    Lab Status:  Final result Specimen:  Urine Updated:  20     Color, UA Red     Clarity, UA Clear     pH, UA 8 5     Leukocytes, UA Large     Nitrite, UA Positive     Protein, UA >=300 mg/dl      Glucose,  (1/4%) mg/dl      Ketones, UA >=160 (4+) mg/dl      Urobilinogen, UA >=8 0 E U /dl      Bilirubin, UA Interference- unable to analyze     Blood, UA Large     Specific [place], UA 1 010    Narrative:       CLINITEK RESULT                 US OB < 14 weeks with transvaginal   Final Result by Jensen Hernández MD ([id number])      No evidence of intrauterine pregnancy  Distended endocervical canal containing blood products, suggesting spontaneous  in progress  The study was marked in [facility] for immediate notification  Workstation performed: [id number]               Procedures  Procedures      ED Course  ED Course as of  0144   Thu 2020   2257 PREGNANCY TEST URINE: Positive   Fri 2020   0011 HCG QUANTITATIVE(!): 11,337 0   0036 Rh Factor: Positive         MDM  Number of Diagnoses or Management Options  Spontaneous  in first trimester: new and requires workup  UTI (urinary tract infection): new and requires workup     Amount and/or Complexity of Data Reviewed  Clinical lab tests: ordered and reviewed  Tests in the radiology section of CPT®: ordered and reviewed  Tests in the medicine section of CPT®: ordered and reviewed  Decide to obtain previous medical records or to obtain history from someone other than the patient: yes  Review and summarize past medical records: yes    Patient Progress  Patient progress: stable    73-year-old woman here with vaginal bleeding, positive home pregnancy test 1 month ago  Patient is not tachycardic or hypotensive  Plan is quantitative beta HCG to stage pregnancy  Pelvic ultrasound to determine location of pregnancy    Symptoms are likely secondary to spontaneous   Patient's pelvic ultrasound shows blood products and tissue in the endocervical canal suggesting spontaneous  in progress  Patient reassessed and is comfortable  Says bleeding is intermittent  Hgb is at baseline  Urinalysis is heavily contaminated by blood, but given positive nitrites will treat for UTI with course of Keflex  Discussed with OBGYN, Dr Glynn Ellison  Given location of POC no indication for misoprostol  Will treat cramping with naproxen  Patient to schedule follow-up with OBGYN for visit in one week  Reviewed return precautions including worsening bleeding, dizziness, shortness of breath, severe cramping  Disposition  Final diagnoses:   Spontaneous  in first trimester   UTI (urinary tract infection)     Time reflects when diagnosis was documented in both MDM as applicable and the Disposition within this note     Time User Action Codes Description Comment    2020  1:14 AM Steven Mchugh Add [O03 9] Spontaneous  in first trimester     2020  1:15 AM Steven Mchugh Add [N39 0] UTI (urinary tract infection)       ED Disposition     ED Disposition Condition Date/Time Comment    Discharge Good Fri 2020  1:14 AM Hilario Rhoades discharge to home/self care              Follow-up Information     Follow up With Specialties Details Why Contact Info Additional [address] [address] Obstetrics and Gynecology Schedule an appointment as soon as possible for a visit in 1 week  [address] [facility] [address] [facility] [id number], [place], [place], [address]   [facility] Emergency Department Emergency Medicine Go to  If symptoms worsen [facility] [address]  [place] [address] ED, [address] [facility] , [place], [place], [address]          Discharge Medication List as of 2/28/2020  1:38 AM      START taking these medications    Details   cephalexin (KEFLEX) 500 mg capsule Take 1 capsule (500 mg total) by mouth every 12 (twelve) hours for 7 days, Starting Fri 2/28/2020, Until Fri 3/6/2020, Normal      naproxen (NAPROSYN) 500 mg tablet Take 1 tablet (500 mg total) by mouth 2 (two) times a day with meals, Starting Fri 2/28/2020, Normal           No discharge procedures on file  PDMP Review     None           ED Provider  Attending physically available and evaluated Jessica Singh I managed the patient along with the ED Attending      Electronically Signed by         Luana Yepez MD  02/28/20 4771

## 2020-02-28 NOTE — DISCHARGE INSTRUCTIONS
Return to ED for severe pain that doesn't go away or heavy bleeding, especially if you get dizzy/lightheaded or short of breath

## 2020-02-29 LAB — BACTERIA UR CULT: NORMAL

## 2020-11-08 ENCOUNTER — HOSPITAL ENCOUNTER (EMERGENCY)
Facility: HOSPITAL | Age: 30
Discharge: HOME | End: 2020-11-08
Attending: EMERGENCY MEDICINE | Admitting: EMERGENCY MEDICINE
Payer: COMMERCIAL

## 2020-11-08 VITALS
DIASTOLIC BLOOD PRESSURE: 75 MMHG | HEART RATE: 98 BPM | SYSTOLIC BLOOD PRESSURE: 118 MMHG | BODY MASS INDEX: 22.59 KG/M2 | RESPIRATION RATE: 18 BRPM | TEMPERATURE: 97.6 F | WEIGHT: 131.61 LBS | OXYGEN SATURATION: 97 %

## 2020-11-08 DIAGNOSIS — K08.89 DENTALGIA: Primary | ICD-10-CM

## 2020-11-08 PROCEDURE — 96372 THER/PROPH/DIAG INJ SC/IM: CPT

## 2020-11-08 PROCEDURE — 99284 EMERGENCY DEPT VISIT MOD MDM: CPT | Performed by: EMERGENCY MEDICINE

## 2020-11-08 PROCEDURE — 99282 EMERGENCY DEPT VISIT SF MDM: CPT

## 2020-11-08 RX ORDER — PENICILLIN V POTASSIUM 250 MG/1
500 TABLET ORAL ONCE
Status: COMPLETED | OUTPATIENT
Start: 2020-11-08 | End: 2020-11-08

## 2020-11-08 RX ORDER — ACETAMINOPHEN 325 MG/1
650 TABLET ORAL ONCE
Status: COMPLETED | OUTPATIENT
Start: 2020-11-08 | End: 2020-11-08

## 2020-11-08 RX ORDER — PENICILLIN V POTASSIUM 500 MG/1
500 TABLET ORAL 4 TIMES DAILY
Qty: 28 TABLET | Refills: 0 | Status: SHIPPED | OUTPATIENT
Start: 2020-11-08 | End: 2020-11-15

## 2020-11-08 RX ORDER — CHLORHEXIDINE GLUCONATE 0.12 MG/ML
15 RINSE ORAL 2 TIMES DAILY
Qty: 120 ML | Refills: 0 | Status: SHIPPED | OUTPATIENT
Start: 2020-11-08

## 2020-11-08 RX ORDER — KETOROLAC TROMETHAMINE 30 MG/ML
15 INJECTION, SOLUTION INTRAMUSCULAR; INTRAVENOUS ONCE
Status: COMPLETED | OUTPATIENT
Start: 2020-11-08 | End: 2020-11-08

## 2020-11-08 RX ADMIN — KETOROLAC TROMETHAMINE 15 MG: 30 INJECTION, SOLUTION INTRAMUSCULAR at 04:03

## 2020-11-08 RX ADMIN — PENICILLIN V POTASSIUM 500 MG: 250 TABLET, FILM COATED ORAL at 04:03

## 2020-11-08 RX ADMIN — ACETAMINOPHEN 650 MG: 325 TABLET ORAL at 04:03

## 2020-11-10 ENCOUNTER — PATIENT OUTREACH (OUTPATIENT)
Dept: CASE MANAGEMENT | Facility: OTHER | Age: 30
End: 2020-11-10

## 2020-11-30 ENCOUNTER — PATIENT OUTREACH (OUTPATIENT)
Dept: CASE MANAGEMENT | Facility: OTHER | Age: 30
End: 2020-11-30

## 2020-12-18 ENCOUNTER — PATIENT OUTREACH (OUTPATIENT)
Dept: CASE MANAGEMENT | Facility: HOSPITAL | Age: 30
End: 2020-12-18

## 2021-05-25 ENCOUNTER — OFFICE VISIT (OUTPATIENT)
Dept: DENTISTRY | Facility: CLINIC | Age: 31
End: 2021-05-25

## 2021-05-25 VITALS — TEMPERATURE: 95.7 F | HEART RATE: 77 BPM | DIASTOLIC BLOOD PRESSURE: 64 MMHG | SYSTOLIC BLOOD PRESSURE: 99 MMHG

## 2021-05-25 DIAGNOSIS — K02.9 DENTAL CARIES: Primary | ICD-10-CM

## 2021-05-25 PROCEDURE — D0140 LIMITED ORAL EVALUATION - PROBLEM FOCUSED: HCPCS | Performed by: DENTIST

## 2021-05-25 PROCEDURE — D0220 INTRAORAL - PERIAPICAL FIRST RADIOGRAPHIC IMAGE: HCPCS | Performed by: DENTIST

## 2021-05-25 PROCEDURE — D0330 PANORAMIC RADIOGRAPHIC IMAGE: HCPCS | Performed by: DENTIST

## 2021-05-25 RX ORDER — AMOXICILLIN 500 MG/1
500 CAPSULE ORAL EVERY 8 HOURS SCHEDULED
Qty: 21 CAPSULE | Refills: 0 | Status: SHIPPED | OUTPATIENT
Start: 2021-05-25 | End: 2021-06-01

## 2021-05-25 NOTE — PATIENT INSTRUCTIONS
Please take 500 mg amoxicillin 3 times a day for 7 days until finished  Come back to get the tooth extracted or let us know if you would like a referral to an outside dental office / OS   Remaining teeth need attention, multiple  Caries present please return for comprehensive exam and treatment
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
## 2021-06-15 ENCOUNTER — TRANSCRIBE ORDERS (OUTPATIENT)
Dept: LAB | Facility: HOSPITAL | Age: 31
End: 2021-06-15

## 2021-06-15 ENCOUNTER — APPOINTMENT (OUTPATIENT)
Dept: LAB | Facility: HOSPITAL | Age: 31
End: 2021-06-15
Payer: COMMERCIAL

## 2021-06-15 DIAGNOSIS — Z01.818 PRE-OPERATIVE EXAM: Primary | ICD-10-CM

## 2021-06-15 PROCEDURE — 86901 BLOOD TYPING SEROLOGIC RH(D): CPT | Performed by: OBSTETRICS & GYNECOLOGY

## 2021-06-15 PROCEDURE — 86900 BLOOD TYPING SEROLOGIC ABO: CPT | Performed by: OBSTETRICS & GYNECOLOGY

## 2021-06-15 PROCEDURE — 86850 RBC ANTIBODY SCREEN: CPT | Performed by: OBSTETRICS & GYNECOLOGY

## 2021-06-16 ENCOUNTER — HOSPITAL ENCOUNTER (OUTPATIENT)
Dept: INFUSION CENTER | Facility: HOSPITAL | Age: 31
Discharge: HOME | End: 2021-06-16
Payer: COMMERCIAL

## 2021-06-16 VITALS
TEMPERATURE: 96.3 F | SYSTOLIC BLOOD PRESSURE: 107 MMHG | HEART RATE: 87 BPM | DIASTOLIC BLOOD PRESSURE: 59 MMHG | RESPIRATION RATE: 18 BRPM

## 2021-06-16 LAB
ANISOCYTOSIS BLD QL SMEAR: PRESENT
BASOPHILS # BLD AUTO: 0 THOUSANDS/ΜL (ref 0–0.1)
BASOPHILS NFR BLD AUTO: 1 % (ref 0–1)
EOSINOPHIL # BLD AUTO: 0.1 THOUSAND/ΜL (ref 0–0.4)
EOSINOPHIL NFR BLD AUTO: 2 % (ref 0–6)
ERYTHROCYTE [DISTWIDTH] IN BLOOD BY AUTOMATED COUNT: 20.1 %
HCT VFR BLD AUTO: 27.2 % (ref 36–46)
HGB BLD-MCNC: 8.5 G/DL (ref 12–16)
HYPERCHROMIA BLD QL SMEAR: PRESENT
LG PLATELETS BLD QL SMEAR: PRESENT
LYMPHOCYTES # BLD AUTO: 0.8 THOUSANDS/ΜL (ref 0.5–4)
LYMPHOCYTES NFR BLD AUTO: 17 % (ref 25–45)
MCH RBC QN AUTO: 20.2 PG (ref 26–34)
MCHC RBC AUTO-ENTMCNC: 31.3 G/DL (ref 31–36)
MCV RBC AUTO: 65 FL (ref 80–100)
MICROCYTES BLD QL AUTO: PRESENT
MONOCYTES # BLD AUTO: 0.4 THOUSAND/ΜL (ref 0.2–0.9)
MONOCYTES NFR BLD AUTO: 8 % (ref 1–10)
NEUTROPHILS # BLD AUTO: 3.2 THOUSANDS/ΜL (ref 1.8–7.8)
NEUTS SEG NFR BLD AUTO: 72 % (ref 45–65)
OVALOCYTES BLD QL SMEAR: PRESENT
PLATELET # BLD AUTO: 155 THOUSANDS/UL (ref 150–450)
PLATELET BLD QL SMEAR: ADEQUATE
PMV BLD AUTO: 9.1 FL (ref 8.9–12.7)
POIKILOCYTOSIS BLD QL SMEAR: PRESENT
POLYCHROMASIA BLD QL SMEAR: PRESENT
RBC # BLD AUTO: 4.21 MILLION/UL (ref 4–5.2)
RBC MORPH BLD: NORMAL
SCHISTOCYTES BLD QL SMEAR: PRESENT
WBC # BLD AUTO: 4.4 THOUSAND/UL (ref 4.5–11)

## 2021-06-16 PROCEDURE — 85025 COMPLETE CBC W/AUTO DIFF WBC: CPT | Performed by: OBSTETRICS & GYNECOLOGY

## 2021-06-16 NOTE — PROGRESS NOTES
Nurse spoke with Dr Lázaro De Oliveira & informed him of pts  hgb today of 8 5  he requested nurse fax him a copy of the results which nurse did  He also stated pt  Will not be transfused today with blood due to hgb of 8 5  He instructed nurse to instruct pt  To keep her appt  With him which nurse did as well  Nurse informed pt  Of all of the above & pt  Is aware/agreeable & verbalized understanding  Pt  Has no further appts  With infusion at this time   Pt  Declined AVS

## 2021-07-28 ENCOUNTER — ANNUAL EXAM (OUTPATIENT)
Dept: OBGYN CLINIC | Facility: CLINIC | Age: 31
End: 2021-07-28

## 2021-07-28 VITALS
BODY MASS INDEX: 21.21 KG/M2 | WEIGHT: 132 LBS | SYSTOLIC BLOOD PRESSURE: 113 MMHG | DIASTOLIC BLOOD PRESSURE: 73 MMHG | HEART RATE: 106 BPM | HEIGHT: 66 IN

## 2021-07-28 DIAGNOSIS — Z30.42 ENCOUNTER FOR SURVEILLANCE OF INJECTABLE CONTRACEPTIVE: ICD-10-CM

## 2021-07-28 DIAGNOSIS — Z30.09 GENERAL COUNSELING AND ADVICE ON FEMALE CONTRACEPTION: ICD-10-CM

## 2021-07-28 DIAGNOSIS — Z01.419 ENCOUNTER FOR ANNUAL ROUTINE GYNECOLOGICAL EXAMINATION: Primary | ICD-10-CM

## 2021-07-28 PROCEDURE — G0145 SCR C/V CYTO,THINLAYER,RESCR: HCPCS | Performed by: NURSE PRACTITIONER

## 2021-07-28 PROCEDURE — 99395 PREV VISIT EST AGE 18-39: CPT | Performed by: NURSE PRACTITIONER

## 2021-07-28 RX ORDER — MEDROXYPROGESTERONE ACETATE 150 MG/ML
150 INJECTION, SUSPENSION INTRAMUSCULAR
Qty: 1 ML | Refills: 5 | Status: SHIPPED | OUTPATIENT
Start: 2021-07-28

## 2021-07-28 NOTE — PATIENT INSTRUCTIONS
PAP results can take up to 2 weeks  Call with needs or concerns  Next annual GYN exam in 1 year  Return for next Depo when due    COVID-19 Instructions    If you are having any of the following:  Cough   Shortness of breath   Fever  If traveled within past 2 weeks internationally or to high risk [place] states  Or been in contact with someone that has     Please call either:   Your PCP office  [phone], option [phone]    They will screen you over the phone and direct you to the nearest appropriate testing location    DO NOT go to your PCP or OB office without calling first

## 2021-07-28 NOTE — LETTER
2021    [address]  [facility] [facility] [id number]        2021    To Toni Liao  : 1990      This letter is to advise you that your recent PAP SMEAR results were reviewed by me and are NORMAL    We will see you in 1 year for your annual exam     Teresa Ahmadi

## 2021-07-28 NOTE — PROGRESS NOTES
Annual Exam    Assessment   1  Encounter for annual routine gynecological examination  Liquid-based pap, screening   2  General counseling and advice on female contraception     3  Encounter for surveillance of injectable contraceptive  medroxyPROGESTERone (DEPO-PROVERA) 150 mg/mL injection     well woman       Plan       All questions answered  Breast self exam technique reviewed and patient encouraged to perform self-exam monthly  Contraception: Depo-Provera injections  Discussed healthy lifestyle modifications  Follow up in 1 year  annual GYN exam    Patient Instructions   PAP results can take up to 2 weeks  Call with needs or concerns  Next annual GYN exam in 1 year  Return for next Depo when due  Pt verbalized understanding of all discussed  Subjective      Shaq Jesus is a 36 y o  [id number] female who presents for annual well woman exam  Periods are not occurring with Depoprovera, lasting 0 days  No intermenstrual bleeding, spotting, or discharge  Last WNL PAP 2017   No current partner, last intercourse was severla months ago, pt states she uses condoms    Current contraception: Depo-Provera injections  History of abnormal Pap smear: no  Family history of uterine or ovarian cancer: no  Regular self breast exam: yes  History of abnormal mammogram: N/A  Family history of breast cancer: no  History of abnormal lipids: unknown  Menstrual History:  OB History        6    Para   3    Term   2       1    AB   2    Living   4       SAB        TAB   2    Ectopic        Multiple   1    Live Births   3                Menarche age: 8  Patient's last menstrual period was 2021  The following portions of the patient's history were reviewed and updated as appropriate: allergies, current medications, past family history, past medical history, past social history, past surgical history and problem list     Review of Systems  Pertinent items are noted in HPI        Objective /73   Pulse (!) 106   Ht 5' 6" (1 676 m)   Wt 59 9 kg (132 lb)   LMP 06/25/2021   BMI 21 31 kg/m²     General: alert and oriented, in no acute distress, alert, appears stated age and cooperative   Heart: regular rate and rhythm, S1, S2 normal, no murmur, click, rub or gallop   Lungs: clear to auscultation bilaterally, WNL respiratory effort, negative cough or SOB   Thyroid: Negative masses   Abdomen: soft, non-tender, without masses or organomegaly   Vulva: normal   Vagina: normal mucosa   Cervix: no cervical motion tenderness and no lesions   Uterus: normal size, non-tender, normal shape and consistency   Adnexa: normal adnexa   Urethra: normal   Breasts: NT,negative masses, discharge, or dimpling

## 2021-08-04 LAB
LAB AP GYN PRIMARY INTERPRETATION: NORMAL
Lab: NORMAL
PATH INTERP SPEC-IMP: NORMAL

## 2021-09-20 ENCOUNTER — TELEPHONE (OUTPATIENT)
Dept: OBGYN CLINIC | Facility: CLINIC | Age: 31
End: 2021-09-20

## 2021-09-23 ENCOUNTER — TELEPHONE (OUTPATIENT)
Dept: OBGYN CLINIC | Facility: CLINIC | Age: 31
End: 2021-09-23

## 2021-11-02 ENCOUNTER — TELEPHONE (OUTPATIENT)
Dept: OBGYN CLINIC | Facility: CLINIC | Age: 31
End: 2021-11-02

## 2021-11-12 ENCOUNTER — OFFICE VISIT (OUTPATIENT)
Dept: OBGYN CLINIC | Facility: CLINIC | Age: 31
End: 2021-11-12

## 2021-11-12 VITALS
WEIGHT: 143 LBS | BODY MASS INDEX: 23.08 KG/M2 | HEART RATE: 79 BPM | DIASTOLIC BLOOD PRESSURE: 72 MMHG | SYSTOLIC BLOOD PRESSURE: 109 MMHG

## 2021-11-12 DIAGNOSIS — B37.9 YEAST INFECTION: Primary | ICD-10-CM

## 2021-11-12 LAB
BV WHIFF TEST VAG QL: NEGATIVE
CLUE CELLS SPEC QL WET PREP: NEGATIVE
PH SMN: 4.5 [PH]
SL AMB POCT WET MOUNT: ABNORMAL
T VAGINALIS VAG QL WET PREP: NEGATIVE
YEAST VAG QL WET PREP: POSITIVE

## 2021-11-12 PROCEDURE — 87210 SMEAR WET MOUNT SALINE/INK: CPT | Performed by: NURSE PRACTITIONER

## 2021-11-12 PROCEDURE — 99213 OFFICE O/P EST LOW 20 MIN: CPT | Performed by: NURSE PRACTITIONER

## 2021-11-12 RX ORDER — FLUCONAZOLE 150 MG/1
150 TABLET ORAL ONCE
Qty: 1 TABLET | Refills: 1 | Status: SHIPPED | OUTPATIENT
Start: 2021-11-12 | End: 2021-11-12